# Patient Record
Sex: FEMALE | Race: WHITE | NOT HISPANIC OR LATINO | Employment: OTHER | URBAN - METROPOLITAN AREA
[De-identification: names, ages, dates, MRNs, and addresses within clinical notes are randomized per-mention and may not be internally consistent; named-entity substitution may affect disease eponyms.]

---

## 2023-03-31 LAB
ALANINE AMINOTRANSFERASE (SGPT) (U/L) IN SER/PLAS: 28 U/L (ref 7–45)
ALBUMIN (G/DL) IN SER/PLAS: 4.3 G/DL (ref 3.4–5)
ALKALINE PHOSPHATASE (U/L) IN SER/PLAS: 93 U/L (ref 33–136)
ANION GAP IN SER/PLAS: 14 MMOL/L (ref 10–20)
ASPARTATE AMINOTRANSFERASE (SGOT) (U/L) IN SER/PLAS: 22 U/L (ref 9–39)
BASOPHILS (10*3/UL) IN BLOOD BY AUTOMATED COUNT: 0.06 X10E9/L (ref 0–0.1)
BASOPHILS/100 LEUKOCYTES IN BLOOD BY AUTOMATED COUNT: 0.8 % (ref 0–2)
BILIRUBIN TOTAL (MG/DL) IN SER/PLAS: 0.8 MG/DL (ref 0–1.2)
CALCIDIOL (25 OH VITAMIN D3) (NG/ML) IN SER/PLAS: 56 NG/ML
CALCIUM (MG/DL) IN SER/PLAS: 9.2 MG/DL (ref 8.6–10.6)
CARBON DIOXIDE, TOTAL (MMOL/L) IN SER/PLAS: 26 MMOL/L (ref 21–32)
CHLORIDE (MMOL/L) IN SER/PLAS: 107 MMOL/L (ref 98–107)
CHOLESTEROL (MG/DL) IN SER/PLAS: 198 MG/DL (ref 0–199)
CHOLESTEROL IN HDL (MG/DL) IN SER/PLAS: 68.2 MG/DL
CHOLESTEROL/HDL RATIO: 2.9
CREATININE (MG/DL) IN SER/PLAS: 0.67 MG/DL (ref 0.5–1.05)
EOSINOPHILS (10*3/UL) IN BLOOD BY AUTOMATED COUNT: 0.16 X10E9/L (ref 0–0.4)
EOSINOPHILS/100 LEUKOCYTES IN BLOOD BY AUTOMATED COUNT: 2.2 % (ref 0–6)
ERYTHROCYTE DISTRIBUTION WIDTH (RATIO) BY AUTOMATED COUNT: 13.4 % (ref 11.5–14.5)
ERYTHROCYTE MEAN CORPUSCULAR HEMOGLOBIN CONCENTRATION (G/DL) BY AUTOMATED: 32.2 G/DL (ref 32–36)
ERYTHROCYTE MEAN CORPUSCULAR VOLUME (FL) BY AUTOMATED COUNT: 94 FL (ref 80–100)
ERYTHROCYTES (10*6/UL) IN BLOOD BY AUTOMATED COUNT: 5.13 X10E12/L (ref 4–5.2)
GFR FEMALE: >90 ML/MIN/1.73M2
GLUCOSE (MG/DL) IN SER/PLAS: 86 MG/DL (ref 74–99)
HEMATOCRIT (%) IN BLOOD BY AUTOMATED COUNT: 48.4 % (ref 36–46)
HEMOGLOBIN (G/DL) IN BLOOD: 15.6 G/DL (ref 12–16)
IMMATURE GRANULOCYTES/100 LEUKOCYTES IN BLOOD BY AUTOMATED COUNT: 0.3 % (ref 0–0.9)
LDL: 108 MG/DL (ref 0–99)
LEUKOCYTES (10*3/UL) IN BLOOD BY AUTOMATED COUNT: 7.4 X10E9/L (ref 4.4–11.3)
LYMPHOCYTES (10*3/UL) IN BLOOD BY AUTOMATED COUNT: 1.41 X10E9/L (ref 0.8–3)
LYMPHOCYTES/100 LEUKOCYTES IN BLOOD BY AUTOMATED COUNT: 19 % (ref 13–44)
MONOCYTES (10*3/UL) IN BLOOD BY AUTOMATED COUNT: 0.6 X10E9/L (ref 0.05–0.8)
MONOCYTES/100 LEUKOCYTES IN BLOOD BY AUTOMATED COUNT: 8.1 % (ref 2–10)
NEUTROPHILS (10*3/UL) IN BLOOD BY AUTOMATED COUNT: 5.16 X10E9/L (ref 1.6–5.5)
NEUTROPHILS/100 LEUKOCYTES IN BLOOD BY AUTOMATED COUNT: 69.6 % (ref 40–80)
NRBC (PER 100 WBCS) BY AUTOMATED COUNT: 0 /100 WBC (ref 0–0)
PLATELETS (10*3/UL) IN BLOOD AUTOMATED COUNT: 316 X10E9/L (ref 150–450)
POTASSIUM (MMOL/L) IN SER/PLAS: 4.1 MMOL/L (ref 3.5–5.3)
PROTEIN TOTAL: 6.6 G/DL (ref 6.4–8.2)
SODIUM (MMOL/L) IN SER/PLAS: 143 MMOL/L (ref 136–145)
THYROTROPIN (MIU/L) IN SER/PLAS BY DETECTION LIMIT <= 0.05 MIU/L: 2.3 MIU/L (ref 0.44–3.98)
TRIGLYCERIDE (MG/DL) IN SER/PLAS: 111 MG/DL (ref 0–149)
UREA NITROGEN (MG/DL) IN SER/PLAS: 20 MG/DL (ref 6–23)
VLDL: 22 MG/DL (ref 0–40)

## 2023-10-15 PROBLEM — M67.432 GANGLION CYST OF DORSUM OF LEFT WRIST: Status: ACTIVE | Noted: 2023-10-15

## 2023-10-15 PROBLEM — M15.9 GENERALIZED OSTEOARTHRITIS: Status: ACTIVE | Noted: 2023-10-15

## 2023-10-15 PROBLEM — I10 BENIGN ESSENTIAL HYPERTENSION: Status: ACTIVE | Noted: 2023-10-15

## 2023-10-15 PROBLEM — H90.3 SENSORINEURAL HEARING LOSS, BILATERAL: Status: ACTIVE | Noted: 2023-10-15

## 2023-10-15 PROBLEM — N32.81 OVERACTIVE BLADDER: Status: ACTIVE | Noted: 2023-10-15

## 2023-10-15 PROBLEM — E78.2 MIXED HYPERLIPIDEMIA: Status: ACTIVE | Noted: 2023-10-15

## 2023-10-15 PROBLEM — H91.90 HEARING LOSS: Status: ACTIVE | Noted: 2023-10-15

## 2023-10-15 PROBLEM — L90.5 SCAR CONDITION AND FIBROSIS OF SKIN: Status: ACTIVE | Noted: 2021-06-17

## 2023-10-15 PROBLEM — L81.4 OTHER MELANIN HYPERPIGMENTATION: Status: ACTIVE | Noted: 2021-06-17

## 2023-10-15 PROBLEM — M85.80 OSTEOPENIA: Status: ACTIVE | Noted: 2023-10-15

## 2023-10-15 PROBLEM — Z97.4 WEARS HEARING AID: Status: ACTIVE | Noted: 2023-10-15

## 2023-10-15 PROBLEM — D18.01 HEMANGIOMA OF SKIN AND SUBCUTANEOUS TISSUE: Status: ACTIVE | Noted: 2021-06-17

## 2023-10-15 PROBLEM — C44.319 BASAL CELL CARCINOMA OF SKIN OF OTHER PARTS OF FACE: Status: ACTIVE | Noted: 2021-06-17

## 2023-10-15 PROBLEM — M47.816 ARTHRITIS, LUMBAR SPINE: Status: ACTIVE | Noted: 2023-10-15

## 2023-10-15 PROBLEM — L57.0 ACTINIC KERATOSIS: Status: ACTIVE | Noted: 2021-06-17

## 2023-10-15 PROBLEM — D48.5 NEOPLASM OF UNCERTAIN BEHAVIOR OF SKIN: Status: ACTIVE | Noted: 2021-06-17

## 2023-10-15 PROBLEM — I87.2 VENOUS STASIS DERMATITIS OF LEFT LOWER EXTREMITY: Status: ACTIVE | Noted: 2023-10-15

## 2023-10-15 PROBLEM — L82.1 OTHER SEBORRHEIC KERATOSIS: Status: ACTIVE | Noted: 2021-06-17

## 2023-10-15 PROBLEM — R19.5 POSITIVE FIT (FECAL IMMUNOCHEMICAL TEST): Status: ACTIVE | Noted: 2023-10-15

## 2023-10-15 PROBLEM — Z85.828 PERSONAL HISTORY OF OTHER MALIGNANT NEOPLASM OF SKIN: Status: ACTIVE | Noted: 2021-06-17

## 2023-10-15 PROBLEM — D22.5 MELANOCYTIC NEVI OF TRUNK: Status: ACTIVE | Noted: 2021-06-17

## 2023-10-15 PROBLEM — M75.30 CALCIFIC TENDINITIS OF SHOULDER: Status: ACTIVE | Noted: 2023-10-15

## 2023-10-15 PROBLEM — H61.21 EXCESSIVE CERUMEN IN EAR CANAL, RIGHT: Status: ACTIVE | Noted: 2023-10-15

## 2023-10-15 RX ORDER — OXYBUTYNIN CHLORIDE 10 MG/1
1 TABLET, EXTENDED RELEASE ORAL DAILY
COMMUNITY
Start: 2018-10-11 | End: 2023-11-15 | Stop reason: SDUPTHER

## 2023-10-15 RX ORDER — DICLOFENAC SODIUM 75 MG/1
TABLET, DELAYED RELEASE ORAL
COMMUNITY
Start: 2016-09-29 | End: 2023-11-20 | Stop reason: SDUPTHER

## 2023-10-15 RX ORDER — ATORVASTATIN CALCIUM 40 MG/1
1 TABLET, FILM COATED ORAL NIGHTLY
COMMUNITY
Start: 2020-06-18 | End: 2023-11-15 | Stop reason: SDUPTHER

## 2023-10-15 RX ORDER — AMLODIPINE BESYLATE 10 MG/1
1 TABLET ORAL DAILY
COMMUNITY
Start: 2015-04-03 | End: 2023-10-17 | Stop reason: SDUPTHER

## 2023-10-15 RX ORDER — LISINOPRIL 40 MG/1
1 TABLET ORAL DAILY
COMMUNITY
End: 2023-11-15 | Stop reason: SDUPTHER

## 2023-10-15 RX ORDER — METHOCARBAMOL 750 MG/1
TABLET, FILM COATED ORAL
COMMUNITY
Start: 2022-04-18 | End: 2023-11-20 | Stop reason: SDUPTHER

## 2023-10-17 ENCOUNTER — OFFICE VISIT (OUTPATIENT)
Dept: PRIMARY CARE | Facility: CLINIC | Age: 73
End: 2023-10-17
Payer: COMMERCIAL

## 2023-10-17 VITALS
HEIGHT: 61 IN | TEMPERATURE: 96.8 F | HEART RATE: 75 BPM | OXYGEN SATURATION: 97 % | BODY MASS INDEX: 32.02 KG/M2 | WEIGHT: 169.6 LBS | DIASTOLIC BLOOD PRESSURE: 81 MMHG | SYSTOLIC BLOOD PRESSURE: 126 MMHG

## 2023-10-17 DIAGNOSIS — I10 BENIGN ESSENTIAL HYPERTENSION: Primary | ICD-10-CM

## 2023-10-17 DIAGNOSIS — N32.81 OVERACTIVE BLADDER: ICD-10-CM

## 2023-10-17 PROBLEM — C44.319 BASAL CELL CARCINOMA OF SKIN OF OTHER PARTS OF FACE: Status: RESOLVED | Noted: 2021-06-17 | Resolved: 2023-10-17

## 2023-10-17 PROCEDURE — 3074F SYST BP LT 130 MM HG: CPT | Performed by: INTERNAL MEDICINE

## 2023-10-17 PROCEDURE — 3079F DIAST BP 80-89 MM HG: CPT | Performed by: INTERNAL MEDICINE

## 2023-10-17 PROCEDURE — 99213 OFFICE O/P EST LOW 20 MIN: CPT | Performed by: INTERNAL MEDICINE

## 2023-10-17 PROCEDURE — 1160F RVW MEDS BY RX/DR IN RCRD: CPT | Performed by: INTERNAL MEDICINE

## 2023-10-17 PROCEDURE — 1036F TOBACCO NON-USER: CPT | Performed by: INTERNAL MEDICINE

## 2023-10-17 PROCEDURE — 1125F AMNT PAIN NOTED PAIN PRSNT: CPT | Performed by: INTERNAL MEDICINE

## 2023-10-17 PROCEDURE — 1159F MED LIST DOCD IN RCRD: CPT | Performed by: INTERNAL MEDICINE

## 2023-10-17 RX ORDER — AMLODIPINE BESYLATE 10 MG/1
10 TABLET ORAL DAILY
Qty: 30 TABLET | Refills: 0 | Status: SHIPPED | OUTPATIENT
Start: 2023-10-17 | End: 2023-11-15 | Stop reason: SDUPTHER

## 2023-10-17 ASSESSMENT — ENCOUNTER SYMPTOMS
FATIGUE: 0
SORE THROAT: 0
VOMITING: 0
DIZZINESS: 0
CONFUSION: 0
LIGHT-HEADEDNESS: 0
PALPITATIONS: 0
ABDOMINAL PAIN: 0
CONSTIPATION: 0
DIARRHEA: 0
COUGH: 0
FEVER: 0
HEADACHES: 0
ARTHRALGIAS: 1
CHILLS: 0
NAUSEA: 0
SHORTNESS OF BREATH: 0

## 2023-10-17 ASSESSMENT — PAIN SCALES - GENERAL: PAINLEVEL: 3

## 2023-10-25 ENCOUNTER — OFFICE VISIT (OUTPATIENT)
Dept: PRIMARY CARE | Facility: CLINIC | Age: 73
End: 2023-10-25
Payer: COMMERCIAL

## 2023-10-25 VITALS
BODY MASS INDEX: 31.96 KG/M2 | HEART RATE: 72 BPM | OXYGEN SATURATION: 98 % | HEIGHT: 61 IN | SYSTOLIC BLOOD PRESSURE: 135 MMHG | DIASTOLIC BLOOD PRESSURE: 79 MMHG | TEMPERATURE: 96.8 F | WEIGHT: 169.3 LBS

## 2023-10-25 DIAGNOSIS — S76.211A STRAIN OF GROIN, RIGHT, INITIAL ENCOUNTER: Primary | ICD-10-CM

## 2023-10-25 PROCEDURE — 1160F RVW MEDS BY RX/DR IN RCRD: CPT | Performed by: INTERNAL MEDICINE

## 2023-10-25 PROCEDURE — 3078F DIAST BP <80 MM HG: CPT | Performed by: INTERNAL MEDICINE

## 2023-10-25 PROCEDURE — 1125F AMNT PAIN NOTED PAIN PRSNT: CPT | Performed by: INTERNAL MEDICINE

## 2023-10-25 PROCEDURE — 99213 OFFICE O/P EST LOW 20 MIN: CPT | Performed by: INTERNAL MEDICINE

## 2023-10-25 PROCEDURE — 1159F MED LIST DOCD IN RCRD: CPT | Performed by: INTERNAL MEDICINE

## 2023-10-25 PROCEDURE — 3075F SYST BP GE 130 - 139MM HG: CPT | Performed by: INTERNAL MEDICINE

## 2023-10-25 PROCEDURE — 1036F TOBACCO NON-USER: CPT | Performed by: INTERNAL MEDICINE

## 2023-10-25 ASSESSMENT — ENCOUNTER SYMPTOMS
CHILLS: 0
SORE THROAT: 0
CONFUSION: 0
PALPITATIONS: 0
FEVER: 0
HEADACHES: 0
BACK PAIN: 1
HEMATURIA: 0
COUGH: 0
FATIGUE: 0
DIARRHEA: 0
ABDOMINAL PAIN: 0
NAUSEA: 0
CONSTIPATION: 0
SHORTNESS OF BREATH: 0
VOMITING: 0
ARTHRALGIAS: 1
MYALGIAS: 1
DYSURIA: 0
LIGHT-HEADEDNESS: 0
DIZZINESS: 0

## 2023-10-25 ASSESSMENT — PAIN SCALES - GENERAL: PAINLEVEL: 3

## 2023-10-25 NOTE — PROGRESS NOTES
"Subjective   Patient ID: Kimberly Segundo is a 73 y.o. female who presents for Follow-up (Groin pain, pulled muscles ).    HPI     Since last visit on 10/17 - was laying in bed when she felt a tightness in her right groin.  Felt firm, leg felt heavy.  Like a cramp, then she tried to stretch out her leg and hurt worse.  Has felt better over last 2 days.  Tried heating pad and Voltaren gel, which did seem to help.    Might be going to visit daughter in [place] over Thanksgiving.  Right shoulder replacement is scheduled for 12/4.    Review of Systems   Constitutional:  Negative for chills, fatigue and fever.   HENT:  Negative for sore throat.    Respiratory:  Negative for cough and shortness of breath.    Cardiovascular:  Negative for chest pain, palpitations and leg swelling.   Gastrointestinal:  Negative for abdominal pain, constipation, diarrhea, nausea and vomiting.   Genitourinary:  Negative for dysuria, hematuria and pelvic pain.   Musculoskeletal:  Positive for arthralgias, back pain, gait problem and myalgias.   Skin:  Negative for rash.   Neurological:  Negative for dizziness, light-headedness and headaches.   Psychiatric/Behavioral:  Negative for confusion.        Objective   /79   Pulse 72   Temp 36 °C (96.8 °F) (Temporal)   Ht 1.549 m (5' 1\")   Wt 76.8 kg (169 lb 4.8 oz)   SpO2 98%   BMI 31.99 kg/m²     Physical Exam  HENT:      Head: Normocephalic and atraumatic.   Pulmonary:      Effort: Pulmonary effort is normal.   Musculoskeletal:      Comments: Able to get onto exam table.  No masses, lumps, rashes, or induration in the right groin / medial thigh area.  No trigger of symptoms with rotation at hip.  Pain localizes to the right proximal medial thigh.   Neurological:      General: No focal deficit present.      Mental Status: She is alert and oriented to person, place, and time. Mental status is at baseline.      Gait: Gait abnormal.      Comments: Uses cane, braces on lower legs "   Psychiatric:         Mood and Affect: Mood normal.         Behavior: Behavior normal.         Thought Content: Thought content normal.         Judgment: Judgment normal.         Assessment/Plan   Problem List Items Addressed This Visit    None  Visit Diagnoses         Codes    Strain of groin, right, initial encounter    -  Primary S76.211A          Right groin strain - sounds like it started with a muscle cramp and then subsequent pulled muscle when she tried to straighten her leg.  No signs of soft tissue disease such as cellulitis or rash.  There are no masses/lumps and no GI or pelvic symptoms that would suggest a hernia.  Continued conservative management recommended.  Stay hydrated and try to stretch as able.  Follow-up if symptoms worsen or fail to improve.

## 2023-11-08 ENCOUNTER — TELEPHONE (OUTPATIENT)
Dept: PRIMARY CARE | Facility: CLINIC | Age: 73
End: 2023-11-08
Payer: COMMERCIAL

## 2023-11-08 NOTE — TELEPHONE ENCOUNTER
Discussed with patient over phone.  She wanted to know my opinion on outpatient shoulder replacement.  She has concerns about going home right after surgery and wants to know if she should insist on staying inpatient for 1-2 nights.  I encouraged her to trust their protocol and ask questions to the surgery coordinator.  If there is a need for her to remain in hospital, I am sure that they will not send her home right away.  Dr. Moore

## 2023-11-08 NOTE — TELEPHONE ENCOUNTER
Pt called to let you know ..  She checked and her Lot# for Oxybutin is not part of recall.  She was told anyone taking the medication got the letter.

## 2023-11-14 ENCOUNTER — TELEPHONE (OUTPATIENT)
Dept: PRIMARY CARE | Facility: CLINIC | Age: 73
End: 2023-11-14
Payer: COMMERCIAL

## 2023-11-14 NOTE — TELEPHONE ENCOUNTER
Patient called stating she is trying to be proactive.  Patient is having shoulder surgery 12-4-23.  Patient will be switching pharmacies to [facility] on 1-1-24.  Patient states if we receive a request from her current pharmacy requesting refill to only refill through December and she will need all new prescriptions for all her medications in January 2024 to be sent to [facility] [phone] fax [phone].

## 2023-11-15 DIAGNOSIS — N32.81 OVERACTIVE BLADDER: ICD-10-CM

## 2023-11-15 DIAGNOSIS — E78.2 MIXED HYPERLIPIDEMIA: Primary | ICD-10-CM

## 2023-11-15 DIAGNOSIS — I10 BENIGN ESSENTIAL HYPERTENSION: ICD-10-CM

## 2023-11-15 RX ORDER — OXYBUTYNIN CHLORIDE 10 MG/1
10 TABLET, EXTENDED RELEASE ORAL DAILY
Qty: 90 TABLET | Refills: 3 | Status: SHIPPED | OUTPATIENT
Start: 2024-01-01 | End: 2023-12-05

## 2023-11-15 RX ORDER — AMLODIPINE BESYLATE 10 MG/1
10 TABLET ORAL DAILY
Qty: 90 TABLET | Refills: 3 | Status: SHIPPED | OUTPATIENT
Start: 2024-01-01 | End: 2023-12-14 | Stop reason: SDUPTHER

## 2023-11-15 RX ORDER — ATORVASTATIN CALCIUM 40 MG/1
40 TABLET, FILM COATED ORAL NIGHTLY
Qty: 90 TABLET | Refills: 3 | Status: SHIPPED | OUTPATIENT
Start: 2024-01-01 | End: 2024-04-01 | Stop reason: SDUPTHER

## 2023-11-15 RX ORDER — LISINOPRIL 40 MG/1
40 TABLET ORAL DAILY
Qty: 90 TABLET | Refills: 3 | Status: SHIPPED | OUTPATIENT
Start: 2024-01-01 | End: 2023-12-05

## 2023-11-20 ENCOUNTER — TELEMEDICINE CLINICAL SUPPORT (OUTPATIENT)
Dept: PREADMISSION TESTING | Facility: HOSPITAL | Age: 73
End: 2023-11-20
Payer: COMMERCIAL

## 2023-11-20 DIAGNOSIS — M15.9 GENERALIZED OSTEOARTHRITIS: Primary | ICD-10-CM

## 2023-11-20 RX ORDER — DICLOFENAC SODIUM 75 MG/1
75 TABLET, DELAYED RELEASE ORAL 2 TIMES DAILY PRN
Qty: 180 TABLET | Refills: 3 | COMMUNITY
Start: 2023-11-20 | End: 2024-02-05 | Stop reason: SDUPTHER

## 2023-11-20 RX ORDER — CHOLECALCIFEROL (VITAMIN D3) 50 MCG
50 TABLET ORAL DAILY
COMMUNITY

## 2023-11-20 RX ORDER — DEXTROMETHORPHAN HYDROBROMIDE, GUAIFENESIN 5; 100 MG/5ML; MG/5ML
650 LIQUID ORAL EVERY 8 HOURS PRN
COMMUNITY
End: 2023-12-04 | Stop reason: HOSPADM

## 2023-11-20 RX ORDER — METHOCARBAMOL 750 MG/1
TABLET, FILM COATED ORAL
Qty: 360 TABLET | Refills: 3 | COMMUNITY
Start: 2023-11-20 | End: 2024-01-03 | Stop reason: SDUPTHER

## 2023-11-21 ENCOUNTER — PRE-ADMISSION TESTING (OUTPATIENT)
Dept: PREADMISSION TESTING | Facility: HOSPITAL | Age: 73
End: 2023-11-21
Payer: COMMERCIAL

## 2023-11-21 ENCOUNTER — HOSPITAL ENCOUNTER (OUTPATIENT)
Dept: CARDIOLOGY | Facility: HOSPITAL | Age: 73
Discharge: HOME | End: 2023-11-21
Payer: COMMERCIAL

## 2023-11-21 ENCOUNTER — LAB (OUTPATIENT)
Dept: LAB | Facility: LAB | Age: 73
End: 2023-11-21
Payer: COMMERCIAL

## 2023-11-21 VITALS
HEIGHT: 59 IN | DIASTOLIC BLOOD PRESSURE: 81 MMHG | TEMPERATURE: 97.9 F | HEART RATE: 82 BPM | BODY MASS INDEX: 31.56 KG/M2 | WEIGHT: 156.53 LBS | SYSTOLIC BLOOD PRESSURE: 150 MMHG | OXYGEN SATURATION: 96 % | RESPIRATION RATE: 18 BRPM

## 2023-11-21 DIAGNOSIS — I10 HYPERTENSION, UNSPECIFIED TYPE: ICD-10-CM

## 2023-11-21 DIAGNOSIS — Z01.818 PREPROCEDURAL EXAMINATION: ICD-10-CM

## 2023-11-21 DIAGNOSIS — I10 HYPERTENSION, UNSPECIFIED TYPE: Primary | ICD-10-CM

## 2023-11-21 LAB
ABO GROUP (TYPE) IN BLOOD: NORMAL
ALBUMIN SERPL BCP-MCNC: 4.2 G/DL (ref 3.4–5)
ALP SERPL-CCNC: 110 U/L (ref 33–136)
ALT SERPL W P-5'-P-CCNC: 20 U/L (ref 7–45)
ANION GAP SERPL CALC-SCNC: 12 MMOL/L (ref 10–20)
ANTIBODY SCREEN: NORMAL
AST SERPL W P-5'-P-CCNC: 20 U/L (ref 9–39)
BASOPHILS # BLD AUTO: 0.05 X10*3/UL (ref 0–0.1)
BASOPHILS NFR BLD AUTO: 0.6 %
BILIRUB SERPL-MCNC: 0.6 MG/DL (ref 0–1.2)
BUN SERPL-MCNC: 17 MG/DL (ref 6–23)
CALCIUM SERPL-MCNC: 9 MG/DL (ref 8.6–10.3)
CHLORIDE SERPL-SCNC: 102 MMOL/L (ref 98–107)
CO2 SERPL-SCNC: 29 MMOL/L (ref 21–32)
CREAT SERPL-MCNC: 0.68 MG/DL (ref 0.5–1.05)
EOSINOPHIL # BLD AUTO: 0.09 X10*3/UL (ref 0–0.4)
EOSINOPHIL NFR BLD AUTO: 1.1 %
ERYTHROCYTE [DISTWIDTH] IN BLOOD BY AUTOMATED COUNT: 12.6 % (ref 11.5–14.5)
GFR SERPL CREATININE-BSD FRML MDRD: >90 ML/MIN/1.73M*2
GLUCOSE SERPL-MCNC: 86 MG/DL (ref 74–99)
HCT VFR BLD AUTO: 49.9 % (ref 36–46)
HGB BLD-MCNC: 15.8 G/DL (ref 12–16)
IMM GRANULOCYTES # BLD AUTO: 0.01 X10*3/UL (ref 0–0.5)
IMM GRANULOCYTES NFR BLD AUTO: 0.1 % (ref 0–0.9)
LYMPHOCYTES # BLD AUTO: 1.19 X10*3/UL (ref 0.8–3)
LYMPHOCYTES NFR BLD AUTO: 15.1 %
MCH RBC QN AUTO: 30 PG (ref 26–34)
MCHC RBC AUTO-ENTMCNC: 31.7 G/DL (ref 32–36)
MCV RBC AUTO: 95 FL (ref 80–100)
MONOCYTES # BLD AUTO: 0.52 X10*3/UL (ref 0.05–0.8)
MONOCYTES NFR BLD AUTO: 6.6 %
NEUTROPHILS # BLD AUTO: 6.01 X10*3/UL (ref 1.6–5.5)
NEUTROPHILS NFR BLD AUTO: 76.5 %
NRBC BLD-RTO: 0 /100 WBCS (ref 0–0)
PLATELET # BLD AUTO: 303 X10*3/UL (ref 150–450)
POTASSIUM SERPL-SCNC: 4 MMOL/L (ref 3.5–5.3)
PROT SERPL-MCNC: 6.8 G/DL (ref 6.4–8.2)
RBC # BLD AUTO: 5.27 X10*6/UL (ref 4–5.2)
RH FACTOR (ANTIGEN D): NORMAL
SODIUM SERPL-SCNC: 139 MMOL/L (ref 136–145)
WBC # BLD AUTO: 7.9 X10*3/UL (ref 4.4–11.3)

## 2023-11-21 PROCEDURE — 86900 BLOOD TYPING SEROLOGIC ABO: CPT

## 2023-11-21 PROCEDURE — 85025 COMPLETE CBC W/AUTO DIFF WBC: CPT

## 2023-11-21 PROCEDURE — 86850 RBC ANTIBODY SCREEN: CPT

## 2023-11-21 PROCEDURE — 80053 COMPREHEN METABOLIC PANEL: CPT

## 2023-11-21 PROCEDURE — 36415 COLL VENOUS BLD VENIPUNCTURE: CPT

## 2023-11-21 PROCEDURE — 87081 CULTURE SCREEN ONLY: CPT | Mod: AHULAB | Performed by: NURSE PRACTITIONER

## 2023-11-21 PROCEDURE — 86901 BLOOD TYPING SEROLOGIC RH(D): CPT

## 2023-11-21 PROCEDURE — 93005 ELECTROCARDIOGRAM TRACING: CPT

## 2023-11-21 PROCEDURE — 99204 OFFICE O/P NEW MOD 45 MIN: CPT | Performed by: NURSE PRACTITIONER

## 2023-11-21 RX ORDER — CHLORHEXIDINE GLUCONATE ORAL RINSE 1.2 MG/ML
15 SOLUTION DENTAL DAILY
Qty: 30 ML | Refills: 0 | Status: SHIPPED | OUTPATIENT
Start: 2023-11-21 | End: 2023-11-23

## 2023-11-21 ASSESSMENT — ENCOUNTER SYMPTOMS
MUSCULOSKELETAL NEGATIVE: 1
NEUROLOGICAL NEGATIVE: 1
CONSTITUTIONAL NEGATIVE: 1
CARDIOVASCULAR NEGATIVE: 1
RESPIRATORY NEGATIVE: 1
GASTROINTESTINAL NEGATIVE: 1

## 2023-11-21 NOTE — CPM/PAT H&P
CPM/PAT Evaluation       Name: Kimberly Segundo (Kimberly Segundo)  /Age: 1950/73 y.o.     In-Person       SURGEON :DR PAM GILMORE     Surgery, Date, and Length:  Right Shoulder Reverse Replacement , 23  HPI:  This a 73y.o. fe-male who presents for presurgical evaluation for  for above mentioned procedure . Imaging shows severe advanced arthritis .After discussion of the risks and benefits with Dr. GILMORE the patient elects to proceed with the planned procedure.       Past Medical History:   Diagnosis Date    Basal cell carcinoma of skin of other parts of face 2021    Carpal tunnel syndrome, bilateral upper limbs 10/10/2018    Bilateral carpal tunnel syndrome    COVID-19 2021    COVID-19 virus infection    DVT (deep venous thrombosis) (CMS/[facility])     s/p knee surgery, was briefly on Lovenox, no further issues,    Encounter for general adult medical examination without abnormal findings 2022    Medicare annual wellness visit, subsequent    Encounter for immunization     Encounter for immunization    Encounter for screening for malignant neoplasm of colon 2022    Colon cancer screening    HLD (hyperlipidemia)     managed on meds    [place] (hard of hearing)     bilateral hearing aids    HTN (hypertension)     managed on meds    OA (osteoarthritis)     OAB (overactive bladder)     managed on meds    Osteopenia     Personal history of other malignant neoplasm of skin     Personal history of malignant neoplasm of skin    Personal history of other medical treatment 2022    History of screening mammography       Past Surgical History:   Procedure Laterality Date    ANKLE SURGERY  10/10/2018    Ankle Surgeryx4 per patient she has 3 metal plates and 4 screws    BLADDER SURGERY  10/10/2018    Bladder Surgery, interstem placement    HYSTERECTOMY  10/10/2018    Hysterectomy    HYSTEROSCOPY  10/10/2018    Hysteroscopy With Endometrial Ablation    HYSTEROSCOPY      with endometrial  ablation    OTHER SURGICAL HISTORY  04/09/2019    Carpal tunnel surgery    OTHER SURGICAL HISTORY  12/28/2018    Carpal tunnel surgery    OTHER SURGICAL HISTORY  07/27/2022    Tonsillectomy with adenoidectomy    TOTAL KNEE ARTHROPLASTY  10/10/2018    Total Knee Replacement Left    TOTAL KNEE ARTHROPLASTY  10/10/2018    Total Knee Replacement Rightx2     Anesthesia History    PONV     Pt denies any past history of anesthetic complications such as , awareness, prolonged sedation, dental damage, aspiration, cardiac arrest, difficult intubation, difficult I.V. access or unexpected hospital admissions.  NO malignant hyperthermia and or pseudo cholinesterase deficiency.    The patient is not  a [employer] and will accept blood and blood products if medically indicated.   No history of blood transfusions .Type and screen sent.         Family History   Problem Relation Name Age of Onset    Arthritis Mother      Hypertension Mother      Heart attack Mother      Osteoarthritis Mother      Heart failure Father      Coronary artery disease Father      Hypertension Father      Stroke Father      Osteoarthritis Sister      Other (hld) Sister      Heart attack Brother      Breast cancer Daughter         Allergies   Allergen Reactions    Ciprofloxacin Unknown     It was so long ago I don't remember    Tramadol Nausea Only and Nausea/vomiting       Prior to Admission medications    Medication Sig Start Date End Date Taking? Authorizing Provider   acetaminophen (Tylenol 8 HOUR) 650 mg ER tablet Take 1 tablet (650 mg) by mouth every 8 hours if needed for mild pain (1 - 3). Do not crush, chew, or split.    Historical Provider, MD   amLODIPine (Norvasc) 10 mg tablet Take 1 tablet (10 mg) by mouth once daily. Do not start before January 1, 2024. 1/1/24 12/31/24  Viviana Moore MD   atorvastatin (Lipitor) 40 mg tablet Take 1 tablet (40 mg) by mouth once daily at bedtime. Do not start before January 1, 2024. 1/1/24 12/31/24   Viviana Moore MD   calcium carbonate/vitamin D3 (CALCIUM 600 + D,3, ORAL) Take 1 capsule by mouth 2 times a day.    Historical Provider, MD   cholecalciferol (Vitamin D3) 50 MCG (2000 UT) tablet Take 1 tablet (50 mcg) by mouth once daily.    Historical Provider, MD   diclofenac (Voltaren) 75 mg EC tablet Take 1 tablet (75 mg) by mouth 2 times a day as needed (pain). Do not crush, chew, or split. 11/20/23 11/19/24  Helen Arshad MD   diclofenac sodium 1 % kit APPLY 4 G OF GEL TO LOWER AFFECTED AREAS 4 TIMES A DAY NO MORE THAN 16 G TO ANY ONE AFFECTED JOINT 11/20/23   Helen Arshad MD   lisinopril 40 mg tablet Take 1 tablet (40 mg) by mouth once daily. Do not start before January 1, 2024. 1/1/24 12/31/24  Viviana Moore MD   methocarbamol (Robaxin) 750 mg tablet TAKE 1 TABLET 4 TIMES DAILY AS NEEDED. 11/20/23   Helen Arshad MD   oxybutynin XL (Ditropan-XL) 10 mg 24 hr tablet Take 1 tablet (10 mg) by mouth once daily. Do not start before January 1, 2024. 1/1/24 12/31/24  Viviana Moore MD   amLODIPine (Norvasc) 10 mg tablet Take 1 tablet (10 mg) by mouth once daily. 10/17/23 11/15/23  Viviana Moore MD   atorvastatin (Lipitor) 40 mg tablet Take 1 tablet (40 mg) by mouth once daily at bedtime. 6/18/20 11/15/23  Historical Provider, MD   diclofenac (Voltaren) 75 mg EC tablet one po bid 9/29/16 11/20/23  Historical Provider, MD   diclofenac sodium 1 % kit APPLY 4 G OF GEL TO LOWER AFFECTED AREAS 4 TIMES A DAY NO MORE THAN 16 G TO ANY ONE AFFECTED JOINT 1/3/22 11/20/23  Historical Provider, MD   lisinopril 40 mg tablet Take 1 tablet (40 mg) by mouth once daily.  11/15/23  Historical Provider, MD   methocarbamol (Robaxin) 750 mg tablet TAKE 1 TABLET 4 TIMES DAILY AS NEEDED. 4/18/22 11/20/23  Historical Provider, MD   oxybutynin XL (Ditropan-XL) 10 mg 24 hr tablet Take 1 tablet (10 mg) by mouth once daily. 10/11/18 11/15/23  Historical Provider, MD ERA ROS:   Constitutional:   neg   "  Neuro/Psych:   neg    Eyes:   Ears:   Nose:   neg    Mouth:   Throat:   Neck:   Cardio:   neg    Respiratory:   neg    Endocrine:   GI:   neg    :   neg    Musculoskeletal:   neg    Hematologic:   neg    Skin:  neg        Physical Exam  Vitals reviewed.   Constitutional:       Appearance: Normal appearance.   HENT:      Head: Normocephalic and atraumatic.      Mouth/Throat:      Mouth: Mucous membranes are dry.   Eyes:      Extraocular Movements: Extraocular movements intact.      Pupils: Pupils are equal, round, and reactive to light.   Cardiovascular:      Rate and Rhythm: Normal rate and regular rhythm.      Pulses: Normal pulses.      Heart sounds: Normal heart sounds.   Pulmonary:      Effort: Pulmonary effort is normal.      Breath sounds: Normal breath sounds.   Musculoskeletal:      Comments: BILATERAL LOWER EXTREMITY BRACES   DECREASE RT SHOULDER    Skin:     General: Skin is warm and dry.   Neurological:      Mental Status: She is alert and oriented to person, place, and time.   Psychiatric:         Mood and Affect: Mood normal.         Behavior: Behavior normal.          PAT AIRWAY:   Airway:     Mallampati::  II   ONE CROWN       /81   Pulse 82   Temp 36.6 °C (97.9 °F)   Resp 18   Ht 1.499 m (4' 11\")   Wt 71 kg (156 lb 8.4 oz)   SpO2 96%   BMI 31.61 kg/m²     EKG   NSR   INFERIOR INFARCT AGE UNDETERMINED   POSS ANTERIOR INFARCT AGE UNDETERMINED     Lab Results   Component Value Date    WBC 7.9 11/21/2023    HGB 15.8 11/21/2023    HCT 49.9 (H) 11/21/2023    MCV 95 11/21/2023     11/21/2023     Lab Results   Component Value Date    GLUCOSE 86 03/31/2023    CALCIUM 9.2 03/31/2023     03/31/2023    K 4.1 03/31/2023    CO2 26 03/31/2023     03/31/2023    BUN 20 03/31/2023    CREATININE 0.67 03/31/2023         ASSESSMENT/PLAN    Patient is a 73 year-old  scheduled for Right Shoulder Reverse Replacement  with Dr. Vázquez   on  12/4/23 .    CARDIOVASCULAR:  RCRI score / Risk: " The patients score is 0 based on history . Per ACC/AHA guidelines this places her  at  3.9% risk for MACE undergoing a intermediate  risk procedure . The patient has the following risk factors: DENIES   Functional Capacity: The patients exercise tolerance is  4  METS. This is based on the patients abililty to ascend a flight of stairs  and walk 2 block w/o chest pain or other anginal equivalents.  . Patient denies  active cardiac symptoms or anginal equivalents .      PULMONARY:  The patient has the following factors that place them at increased risk of perioperative pulmonary complications;age greater than 65/BMI greater than 27/greater than 2.5 hour procedure.  Postoperatively the patient would benefit from early pulmonary toilet/incentive spirometry q 1-2 hours while awake/pulse oximetry/cautious use of respiratory depressant medications such as opioids/elevate the HOB/oral hygiene.      DVT:  CAPRINI SCORE=11  The patient has the following factors that increase her  Risk for thrombus formation ; Virchow's triad ,age>70, bmi>25, TSA, , HX DVT after B/L TKA , Surgical procedure >2 hrs  procedure .    Recommendations: DVT prophylaxis  per Dr. Vázquez  protocol . SCD's, BIJU's, and early ambulation are recommended. Heparin or LMWH is recommended for the very high risk .      Risk assessment complete.  Patient is scheduled for  intermediate  surgical risk procedure.  Patient is considered an acceptable  risk to proceed with the planned procedure.      Preoperative medication instructions were provided and reviewed with the patient.  Any additional testing or evaluation was explained to the patient.  Nothing by mouth instructions were discussed and patient's questions were answered prior to conclusion to this encounter.  Patient verbalized understanding of preoperative instructions given in preadmission testing; discharge instructions available in EMR.

## 2023-11-21 NOTE — PREPROCEDURE INSTRUCTIONS
Medication List            Accurate as of November 21, 2023  9:44 AM. Always use your most recent med list.                acetaminophen 650 mg ER tablet  Commonly known as: Tylenol 8 HOUR  Medication Adjustments for Surgery: Take morning of surgery with sip of water, no other fluids     amLODIPine 10 mg tablet  Commonly known as: Norvasc  Take 1 tablet (10 mg) by mouth once daily. Do not start before January 1, 2024.  Start taking on: January 1, 2024  Medication Adjustments for Surgery: Take morning of surgery with sip of water, no other fluids     atorvastatin 40 mg tablet  Commonly known as: Lipitor  Take 1 tablet (40 mg) by mouth once daily at bedtime. Do not start before January 1, 2024.  Start taking on: January 1, 2024  Medication Adjustments for Surgery: Take morning of surgery with sip of water, no other fluids     CALCIUM 600 + D(3) ORAL  Medication Adjustments for Surgery: Continue until night before surgery     chlorhexidine 0.12 % solution  Commonly known as: Peridex  Use 15 mL in the mouth or throat once daily for 2 days.  Medication Adjustments for Surgery: Other (Comment)  Notes to patient: Use as directed , discard remainder      * diclofenac sodium 1 % kit  APPLY 4 G OF GEL TO LOWER AFFECTED AREAS 4 TIMES A DAY NO MORE THAN 16 G TO ANY ONE AFFECTED JOINT  Medication Adjustments for Surgery: Other (Comment)  Notes to patient: Stop using when surgical showers begin      * diclofenac 75 mg EC tablet  Commonly known as: Voltaren  Take 1 tablet (75 mg) by mouth 2 times a day as needed (pain). Do not crush, chew, or split.  Medication Adjustments for Surgery: Stop 7 days before surgery     lisinopril 40 mg tablet  Take 1 tablet (40 mg) by mouth once daily. Do not start before January 1, 2024.  Start taking on: January 1, 2024  Medication Adjustments for Surgery: Continue until night before surgery     methocarbamol 750 mg tablet  Commonly known as: Robaxin  TAKE 1 TABLET 4 TIMES DAILY AS  NEEDED.  Medication Adjustments for Surgery: Continue until night before surgery     oxybutynin XL 10 mg 24 hr tablet  Commonly known as: Ditropan-XL  Take 1 tablet (10 mg) by mouth once daily. Do not start before January 1, 2024.  Start taking on: January 1, 2024  Medication Adjustments for Surgery: Continue until night before surgery     Vitamin D3 50 MCG (2000 UT) tablet  Generic drug: cholecalciferol  Medication Adjustments for Surgery: Continue until night before surgery           * This list has 2 medication(s) that are the same as other medications prescribed for you. Read the directions carefully, and ask your doctor or other care provider to review them with you.                       CONTACT SURGEON'S OFFICE IF YOU DEVELOP:  * Fever = 100.4 F   * New respiratory symptoms (e.g. cough, shortness of breath, respiratory distress, sore throat)  * Recent loss of taste or smell  *Flu like symptoms such as headache, fatigue or gastrointestinal symptoms  * You develop any open sores, shingles, burning or painful urination   AND/OR:  * You no longer wish to have the surgery.  * Any other personal circumstances change that may lead to the need to cancel or defer this surgery.  *You were admitted to any hospital within one week of your planned procedure.    SMOKING:  *Quitting smoking can make a huge difference to your health and recovery from surgery.    *If you need help with quitting, call 1-891-QUIT-NOW.    THE DAY BEFORE SURGERY:  *Do not eat any food after midnight the night before surgery.   *You are permitted to drink clear liquids (i.e. water, black coffee, tea, clear broth, apple juice) up to 2 hours before your surgery.  DIABETICS:  Please check fasting blood sugar  upon waking up.  If fasting sugar is <80 mg/dl, please drink 100ml/3oz of apple juice no later than 2 hours prior to surgery.      SURGICAL TIME  *You will be contacted between 2 p.m. and 6 p.m. the business day before your surgery with your  arrival time.  *If you haven't received a call by 6pm, call [phone].  *Scheduled surgery times may change and you will be notified if this occurs-check your personal voicemail for any updates.    ON THE MORNING OF SURGERY:  *Wear comfortable, loose fitting clothing.   *Do not use moisturizers, creams, lotions or perfume.  *All jewelry and valuables should be left at home.  *Prosthetic devices such as contact lenses, hearing aids, dentures, eyelash extensions, hairpins and body piercing must be removed before surgery.    BRING WITH YOU:  *Photo ID and insurance card  *Current list of medicines and allergies  *Pacemaker/Defibrillator/Heart stent cards  *CPAP machine and mask  *Slings/splints/crutches  *Copy of your complete Advanced Directive/DHPOA-if applicable  *Neurostimulator implant remote    PARKING AND ARRIVAL:  *Check in at the Main Entrance desk and let them know you are here for surgery.  *You will be directed to the 2nd floor surgical waiting area.    AFTER OUTPATIENT SURGERY:  *A responsible adult MUST accompany you at the time of discharge and stay with you for 24 hours after your surgery.  *You may NOT drive yourself home after surgery.  *You may use a taxi or ride sharing service ([employer], [employer]) to return home ONLY if you are accompanied by a friend or family member.  *Instructions for resuming your medications will be provided by your surgeon.         YOU HAVE REVIEWED THE MEDICATIONS ON THIS SHEET AND YOU VERIFY THESE ARE ALL THE MEDICATIONS AND OVER THE COUNTER MEDICATIONS THAT YOU TAKE .

## 2023-11-21 NOTE — H&P (VIEW-ONLY)
CPM/PAT Evaluation       Name: Kimberly Segundo (Kimberly Segundo)  /Age: 1950/73 y.o.     In-Person       SURGEON :DR PAM GILMORE     Surgery, Date, and Length:  Right Shoulder Reverse Replacement , 23  HPI:  This a 73y.o. fe-male who presents for presurgical evaluation for  for above mentioned procedure . Imaging shows severe advanced arthritis .After discussion of the risks and benefits with Dr. GILMORE the patient elects to proceed with the planned procedure.       Past Medical History:   Diagnosis Date    Basal cell carcinoma of skin of other parts of face 2021    Carpal tunnel syndrome, bilateral upper limbs 10/10/2018    Bilateral carpal tunnel syndrome    COVID-19 2021    COVID-19 virus infection    DVT (deep venous thrombosis) (CMS/[facility])     s/p knee surgery, was briefly on Lovenox, no further issues,    Encounter for general adult medical examination without abnormal findings 2022    Medicare annual wellness visit, subsequent    Encounter for immunization     Encounter for immunization    Encounter for screening for malignant neoplasm of colon 2022    Colon cancer screening    HLD (hyperlipidemia)     managed on meds    [place] (hard of hearing)     bilateral hearing aids    HTN (hypertension)     managed on meds    OA (osteoarthritis)     OAB (overactive bladder)     managed on meds    Osteopenia     Personal history of other malignant neoplasm of skin     Personal history of malignant neoplasm of skin    Personal history of other medical treatment 2022    History of screening mammography       Past Surgical History:   Procedure Laterality Date    ANKLE SURGERY  10/10/2018    Ankle Surgeryx4 per patient she has 3 metal plates and 4 screws    BLADDER SURGERY  10/10/2018    Bladder Surgery, interstem placement    HYSTERECTOMY  10/10/2018    Hysterectomy    HYSTEROSCOPY  10/10/2018    Hysteroscopy With Endometrial Ablation    HYSTEROSCOPY      with endometrial  ablation    OTHER SURGICAL HISTORY  04/09/2019    Carpal tunnel surgery    OTHER SURGICAL HISTORY  12/28/2018    Carpal tunnel surgery    OTHER SURGICAL HISTORY  07/27/2022    Tonsillectomy with adenoidectomy    TOTAL KNEE ARTHROPLASTY  10/10/2018    Total Knee Replacement Left    TOTAL KNEE ARTHROPLASTY  10/10/2018    Total Knee Replacement Rightx2     Anesthesia History    PONV     Pt denies any past history of anesthetic complications such as , awareness, prolonged sedation, dental damage, aspiration, cardiac arrest, difficult intubation, difficult I.V. access or unexpected hospital admissions.  NO malignant hyperthermia and or pseudo cholinesterase deficiency.    The patient is not  a Catholic and will accept blood and blood products if medically indicated.   No history of blood transfusions .Type and screen sent.         Family History   Problem Relation Name Age of Onset    Arthritis Mother      Hypertension Mother      Heart attack Mother      Osteoarthritis Mother      Heart failure Father      Coronary artery disease Father      Hypertension Father      Stroke Father      Osteoarthritis Sister      Other (hld) Sister      Heart attack Brother      Breast cancer Daughter         Allergies   Allergen Reactions    Ciprofloxacin Unknown     It was so long ago I don't remember    Tramadol Nausea Only and Nausea/vomiting       Prior to Admission medications    Medication Sig Start Date End Date Taking? Authorizing Provider   acetaminophen (Tylenol 8 HOUR) 650 mg ER tablet Take 1 tablet (650 mg) by mouth every 8 hours if needed for mild pain (1 - 3). Do not crush, chew, or split.    Historical Provider, MD   amLODIPine (Norvasc) 10 mg tablet Take 1 tablet (10 mg) by mouth once daily. Do not start before January 1, 2024. 1/1/24 12/31/24  Viviana Moore MD   atorvastatin (Lipitor) 40 mg tablet Take 1 tablet (40 mg) by mouth once daily at bedtime. Do not start before January 1, 2024. 1/1/24 12/31/24   Viviana Moore MD   calcium carbonate/vitamin D3 (CALCIUM 600 + D,3, ORAL) Take 1 capsule by mouth 2 times a day.    Historical Provider, MD   cholecalciferol (Vitamin D3) 50 MCG (2000 UT) tablet Take 1 tablet (50 mcg) by mouth once daily.    Historical Provider, MD   diclofenac (Voltaren) 75 mg EC tablet Take 1 tablet (75 mg) by mouth 2 times a day as needed (pain). Do not crush, chew, or split. 11/20/23 11/19/24  Helen Arshad MD   diclofenac sodium 1 % kit APPLY 4 G OF GEL TO LOWER AFFECTED AREAS 4 TIMES A DAY NO MORE THAN 16 G TO ANY ONE AFFECTED JOINT 11/20/23   Helen Arshad MD   lisinopril 40 mg tablet Take 1 tablet (40 mg) by mouth once daily. Do not start before January 1, 2024. 1/1/24 12/31/24  Viviana Moore MD   methocarbamol (Robaxin) 750 mg tablet TAKE 1 TABLET 4 TIMES DAILY AS NEEDED. 11/20/23   Helen Arshad MD   oxybutynin XL (Ditropan-XL) 10 mg 24 hr tablet Take 1 tablet (10 mg) by mouth once daily. Do not start before January 1, 2024. 1/1/24 12/31/24  Viviana Moore MD   amLODIPine (Norvasc) 10 mg tablet Take 1 tablet (10 mg) by mouth once daily. 10/17/23 11/15/23  Viviana Moore MD   atorvastatin (Lipitor) 40 mg tablet Take 1 tablet (40 mg) by mouth once daily at bedtime. 6/18/20 11/15/23  Historical Provider, MD   diclofenac (Voltaren) 75 mg EC tablet one po bid 9/29/16 11/20/23  Historical Provider, MD   diclofenac sodium 1 % kit APPLY 4 G OF GEL TO LOWER AFFECTED AREAS 4 TIMES A DAY NO MORE THAN 16 G TO ANY ONE AFFECTED JOINT 1/3/22 11/20/23  Historical Provider, MD   lisinopril 40 mg tablet Take 1 tablet (40 mg) by mouth once daily.  11/15/23  Historical Provider, MD   methocarbamol (Robaxin) 750 mg tablet TAKE 1 TABLET 4 TIMES DAILY AS NEEDED. 4/18/22 11/20/23  Historical Provider, MD   oxybutynin XL (Ditropan-XL) 10 mg 24 hr tablet Take 1 tablet (10 mg) by mouth once daily. 10/11/18 11/15/23  Historical Provider, MD REA ROS:   Constitutional:   neg   "  Neuro/Psych:   neg    Eyes:   Ears:   Nose:   neg    Mouth:   Throat:   Neck:   Cardio:   neg    Respiratory:   neg    Endocrine:   GI:   neg    :   neg    Musculoskeletal:   neg    Hematologic:   neg    Skin:  neg        Physical Exam  Vitals reviewed.   Constitutional:       Appearance: Normal appearance.   HENT:      Head: Normocephalic and atraumatic.      Mouth/Throat:      Mouth: Mucous membranes are dry.   Eyes:      Extraocular Movements: Extraocular movements intact.      Pupils: Pupils are equal, round, and reactive to light.   Cardiovascular:      Rate and Rhythm: Normal rate and regular rhythm.      Pulses: Normal pulses.      Heart sounds: Normal heart sounds.   Pulmonary:      Effort: Pulmonary effort is normal.      Breath sounds: Normal breath sounds.   Musculoskeletal:      Comments: BILATERAL LOWER EXTREMITY BRACES   DECREASE RT SHOULDER    Skin:     General: Skin is warm and dry.   Neurological:      Mental Status: She is alert and oriented to person, place, and time.   Psychiatric:         Mood and Affect: Mood normal.         Behavior: Behavior normal.          PAT AIRWAY:   Airway:     Mallampati::  II   [place]       /81   Pulse 82   Temp 36.6 °C (97.9 °F)   Resp 18   Ht 1.499 m (4' 11\")   Wt 71 kg (156 lb 8.4 oz)   SpO2 96%   BMI 31.61 kg/m²     EKG   NSR   INFERIOR INFARCT AGE UNDETERMINED   POSS ANTERIOR INFARCT AGE UNDETERMINED     Lab Results   Component Value Date    WBC 7.9 11/21/2023    HGB 15.8 11/21/2023    HCT 49.9 (H) 11/21/2023    MCV 95 11/21/2023     11/21/2023     Lab Results   Component Value Date    GLUCOSE 86 03/31/2023    CALCIUM 9.2 03/31/2023     03/31/2023    K 4.1 03/31/2023    CO2 26 03/31/2023     03/31/2023    BUN 20 03/31/2023    CREATININE 0.67 03/31/2023         ASSESSMENT/PLAN    Patient is a 73 year-old  scheduled for Right Shoulder Reverse Replacement  with Dr. Vázquez   on  12/4/23 .    CARDIOVASCULAR:  RCRI score / Risk: " The patients score is 0 based on history . Per ACC/AHA guidelines this places her  at  3.9% risk for MACE undergoing a intermediate  risk procedure . The patient has the following risk factors: DENIES   Functional Capacity: The patients exercise tolerance is  4  METS. This is based on the patients abililty to ascend a flight of stairs  and walk 2 block w/o chest pain or other anginal equivalents.  . Patient denies  active cardiac symptoms or anginal equivalents .      PULMONARY:  The patient has the following factors that place them at increased risk of perioperative pulmonary complications;age greater than 65/BMI greater than 27/greater than 2.5 hour procedure.  Postoperatively the patient would benefit from early pulmonary toilet/incentive spirometry q 1-2 hours while awake/pulse oximetry/cautious use of respiratory depressant medications such as opioids/elevate the HOB/oral hygiene.      DVT:  CAPRINI SCORE=11  The patient has the following factors that increase her  Risk for thrombus formation ; Virchow's triad ,age>70, bmi>25, TSA, , HX DVT after B/L TKA , Surgical procedure >2 hrs  procedure .    Recommendations: DVT prophylaxis  per Dr. Vázquez  protocol . SCD's, BIJU's, and early ambulation are recommended. Heparin or LMWH is recommended for the very high risk .      Risk assessment complete.  Patient is scheduled for  intermediate  surgical risk procedure.  Patient is considered an acceptable  risk to proceed with the planned procedure.      Preoperative medication instructions were provided and reviewed with the patient.  Any additional testing or evaluation was explained to the patient.  Nothing by mouth instructions were discussed and patient's questions were answered prior to conclusion to this encounter.  Patient verbalized understanding of preoperative instructions given in preadmission testing; discharge instructions available in EMR.

## 2023-11-22 LAB
ATRIAL RATE: 73 BPM
P AXIS: 36 DEGREES
P OFFSET: 203 MS
P ONSET: 149 MS
PR INTERVAL: 142 MS
Q ONSET: 220 MS
QRS COUNT: 12 BEATS
QRS DURATION: 80 MS
QT INTERVAL: 412 MS
QTC CALCULATION(BAZETT): 453 MS
QTC FREDERICIA: 440 MS
R AXIS: 7 DEGREES
T AXIS: -6 DEGREES
T OFFSET: 426 MS
VENTRICULAR RATE: 73 BPM

## 2023-11-23 LAB — STAPHYLOCOCCUS SPEC CULT: NORMAL

## 2023-11-30 DIAGNOSIS — M70.62 TROCHANTERIC BURSITIS OF LEFT HIP: Primary | ICD-10-CM

## 2023-11-30 DIAGNOSIS — M25.511 RIGHT SHOULDER PAIN, UNSPECIFIED CHRONICITY: ICD-10-CM

## 2023-12-01 ENCOUNTER — HOSPITAL ENCOUNTER (OUTPATIENT)
Dept: RADIOLOGY | Facility: HOSPITAL | Age: 73
Discharge: HOME | End: 2023-12-01
Payer: COMMERCIAL

## 2023-12-01 DIAGNOSIS — M25.511 RIGHT SHOULDER PAIN, UNSPECIFIED CHRONICITY: ICD-10-CM

## 2023-12-01 PROCEDURE — 73030 X-RAY EXAM OF SHOULDER: CPT | Mod: RT

## 2023-12-01 PROCEDURE — 73030 X-RAY EXAM OF SHOULDER: CPT | Mod: RIGHT SIDE | Performed by: RADIOLOGY

## 2023-12-04 ENCOUNTER — ANESTHESIA (OUTPATIENT)
Dept: OPERATING ROOM | Facility: HOSPITAL | Age: 73
End: 2023-12-04
Payer: COMMERCIAL

## 2023-12-04 ENCOUNTER — ANESTHESIA EVENT (OUTPATIENT)
Dept: OPERATING ROOM | Facility: HOSPITAL | Age: 73
End: 2023-12-04
Payer: COMMERCIAL

## 2023-12-04 ENCOUNTER — HOSPITAL ENCOUNTER (OUTPATIENT)
Facility: HOSPITAL | Age: 73
Setting detail: OUTPATIENT SURGERY
Discharge: HOME | End: 2023-12-04
Attending: ORTHOPAEDIC SURGERY | Admitting: ORTHOPAEDIC SURGERY
Payer: COMMERCIAL

## 2023-12-04 ENCOUNTER — PHARMACY VISIT (OUTPATIENT)
Dept: PHARMACY | Facility: CLINIC | Age: 73
End: 2023-12-04
Payer: MEDICARE

## 2023-12-04 ENCOUNTER — HOME HEALTH ADMISSION (OUTPATIENT)
Dept: HOME HEALTH SERVICES | Facility: HOME HEALTH | Age: 73
End: 2023-12-04
Payer: COMMERCIAL

## 2023-12-04 ENCOUNTER — APPOINTMENT (OUTPATIENT)
Dept: RADIOLOGY | Facility: HOSPITAL | Age: 73
End: 2023-12-04
Payer: COMMERCIAL

## 2023-12-04 VITALS
WEIGHT: 169.97 LBS | BODY MASS INDEX: 34.27 KG/M2 | TEMPERATURE: 97.5 F | HEART RATE: 72 BPM | HEIGHT: 59 IN | RESPIRATION RATE: 16 BRPM | DIASTOLIC BLOOD PRESSURE: 78 MMHG | SYSTOLIC BLOOD PRESSURE: 132 MMHG | OXYGEN SATURATION: 96 %

## 2023-12-04 DIAGNOSIS — G89.18 ACUTE POST-OPERATIVE PAIN: Primary | ICD-10-CM

## 2023-12-04 DIAGNOSIS — M19.011 PRIMARY OSTEOARTHRITIS, RIGHT SHOULDER: ICD-10-CM

## 2023-12-04 PROBLEM — Z98.890 PONV (POSTOPERATIVE NAUSEA AND VOMITING): Status: ACTIVE | Noted: 2023-12-04

## 2023-12-04 PROBLEM — R11.2 PONV (POSTOPERATIVE NAUSEA AND VOMITING): Status: ACTIVE | Noted: 2023-12-04

## 2023-12-04 LAB
ABO GROUP (TYPE) IN BLOOD: NORMAL
RH FACTOR (ANTIGEN D): NORMAL

## 2023-12-04 PROCEDURE — 7100000001 HC RECOVERY ROOM TIME - INITIAL BASE CHARGE: Performed by: ORTHOPAEDIC SURGERY

## 2023-12-04 PROCEDURE — 2500000001 HC RX 250 WO HCPCS SELF ADMINISTERED DRUGS (ALT 637 FOR MEDICARE OP): Performed by: STUDENT IN AN ORGANIZED HEALTH CARE EDUCATION/TRAINING PROGRAM

## 2023-12-04 PROCEDURE — 36415 COLL VENOUS BLD VENIPUNCTURE: CPT | Performed by: ORTHOPAEDIC SURGERY

## 2023-12-04 PROCEDURE — A23472 PR RECONSTR TOTAL SHOULDER IMPLANT: Performed by: ANESTHESIOLOGY

## 2023-12-04 PROCEDURE — 2500000004 HC RX 250 GENERAL PHARMACY W/ HCPCS (ALT 636 FOR OP/ED): Performed by: ANESTHESIOLOGIST ASSISTANT

## 2023-12-04 PROCEDURE — 2500000005 HC RX 250 GENERAL PHARMACY W/O HCPCS: Performed by: ANESTHESIOLOGIST ASSISTANT

## 2023-12-04 PROCEDURE — A23472 PR RECONSTR TOTAL SHOULDER IMPLANT: Performed by: ANESTHESIOLOGIST ASSISTANT

## 2023-12-04 PROCEDURE — 97535 SELF CARE MNGMENT TRAINING: CPT | Mod: GO | Performed by: OCCUPATIONAL THERAPIST

## 2023-12-04 PROCEDURE — 97116 GAIT TRAINING THERAPY: CPT | Mod: GP

## 2023-12-04 PROCEDURE — C1776 JOINT DEVICE (IMPLANTABLE): HCPCS | Performed by: ORTHOPAEDIC SURGERY

## 2023-12-04 PROCEDURE — 3600000005 HC OR TIME - INITIAL BASE CHARGE - PROCEDURE LEVEL FIVE: Performed by: ORTHOPAEDIC SURGERY

## 2023-12-04 PROCEDURE — 23472 RECONSTRUCT SHOULDER JOINT: CPT | Performed by: ORTHOPAEDIC SURGERY

## 2023-12-04 PROCEDURE — 73020 X-RAY EXAM OF SHOULDER: CPT | Mod: RIGHT SIDE | Performed by: RADIOLOGY

## 2023-12-04 PROCEDURE — 7100000010 HC PHASE TWO TIME - EACH INCREMENTAL 1 MINUTE: Performed by: ORTHOPAEDIC SURGERY

## 2023-12-04 PROCEDURE — 2500000005 HC RX 250 GENERAL PHARMACY W/O HCPCS: Performed by: STUDENT IN AN ORGANIZED HEALTH CARE EDUCATION/TRAINING PROGRAM

## 2023-12-04 PROCEDURE — RXMED WILLOW AMBULATORY MEDICATION CHARGE

## 2023-12-04 PROCEDURE — 2780000003 HC OR 278 NO HCPCS: Performed by: ORTHOPAEDIC SURGERY

## 2023-12-04 PROCEDURE — 3700000001 HC GENERAL ANESTHESIA TIME - INITIAL BASE CHARGE: Performed by: ORTHOPAEDIC SURGERY

## 2023-12-04 PROCEDURE — 7100000002 HC RECOVERY ROOM TIME - EACH INCREMENTAL 1 MINUTE: Performed by: ORTHOPAEDIC SURGERY

## 2023-12-04 PROCEDURE — 64415 NJX AA&/STRD BRCH PLXS IMG: CPT | Performed by: ANESTHESIOLOGY

## 2023-12-04 PROCEDURE — C1713 ANCHOR/SCREW BN/BN,TIS/BN: HCPCS | Performed by: ORTHOPAEDIC SURGERY

## 2023-12-04 PROCEDURE — 2720000007 HC OR 272 NO HCPCS: Performed by: ORTHOPAEDIC SURGERY

## 2023-12-04 PROCEDURE — 99100 ANES PT EXTEME AGE<1 YR&>70: CPT | Performed by: ANESTHESIOLOGY

## 2023-12-04 PROCEDURE — 3700000002 HC GENERAL ANESTHESIA TIME - EACH INCREMENTAL 1 MINUTE: Performed by: ORTHOPAEDIC SURGERY

## 2023-12-04 PROCEDURE — 97530 THERAPEUTIC ACTIVITIES: CPT | Mod: GO | Performed by: OCCUPATIONAL THERAPIST

## 2023-12-04 PROCEDURE — 2500000004 HC RX 250 GENERAL PHARMACY W/ HCPCS (ALT 636 FOR OP/ED): Performed by: STUDENT IN AN ORGANIZED HEALTH CARE EDUCATION/TRAINING PROGRAM

## 2023-12-04 PROCEDURE — 97165 OT EVAL LOW COMPLEX 30 MIN: CPT | Mod: GO | Performed by: OCCUPATIONAL THERAPIST

## 2023-12-04 PROCEDURE — 3600000010 HC OR TIME - EACH INCREMENTAL 1 MINUTE - PROCEDURE LEVEL FIVE: Performed by: ORTHOPAEDIC SURGERY

## 2023-12-04 PROCEDURE — 73020 X-RAY EXAM OF SHOULDER: CPT | Mod: RT,FR

## 2023-12-04 PROCEDURE — 97161 PT EVAL LOW COMPLEX 20 MIN: CPT | Mod: GP

## 2023-12-04 PROCEDURE — 7100000009 HC PHASE TWO TIME - INITIAL BASE CHARGE: Performed by: ORTHOPAEDIC SURGERY

## 2023-12-04 DEVICE — GLENOID PIN, TARGETER, 2.8MM, STAINLESS: Type: IMPLANTABLE DEVICE | Site: SHOULDER | Status: FUNCTIONAL

## 2023-12-04 DEVICE — 5.5X28MM PERIPHERAL SCREW, LOCKING
Type: IMPLANTABLE DEVICE | Site: SHOULDER | Status: FUNCTIONAL
Brand: ARTHREX®

## 2023-12-04 DEVICE — IMPLANTABLE DEVICE: Type: IMPLANTABLE DEVICE | Site: SHOULDER | Status: FUNCTIONAL

## 2023-12-04 DEVICE — 24MM +2 LAT BASEPLATE, MODULAR
Type: IMPLANTABLE DEVICE | Site: SHOULDER | Status: FUNCTIONAL
Brand: ARTHREX®

## 2023-12-04 DEVICE — 5.5X24MM PERIPHERAL SCREW, LOCKING
Type: IMPLANTABLE DEVICE | Site: SHOULDER | Status: FUNCTIONAL
Brand: ARTHREX®

## 2023-12-04 DEVICE — UNIVERS REVERS HUMERAL STEM, 5MM
Type: IMPLANTABLE DEVICE | Site: SHOULDER | Status: FUNCTIONAL
Brand: ARTHREX®

## 2023-12-04 DEVICE — 33 +4 LAT/24 GLENOSPHERE
Type: IMPLANTABLE DEVICE | Site: SHOULDER | Status: FUNCTIONAL
Brand: ARTHREX®

## 2023-12-04 DEVICE — 5.5X20MM PERIPHERAL SCREW, LOCKING
Type: IMPLANTABLE DEVICE | Site: SHOULDER | Status: FUNCTIONAL
Brand: ARTHREX®

## 2023-12-04 RX ORDER — OXYCODONE HYDROCHLORIDE 5 MG/1
10 TABLET ORAL ONCE
Status: DISCONTINUED | OUTPATIENT
Start: 2023-12-04 | End: 2023-12-04 | Stop reason: HOSPADM

## 2023-12-04 RX ORDER — ROCURONIUM BROMIDE 10 MG/ML
INJECTION, SOLUTION INTRAVENOUS AS NEEDED
Status: DISCONTINUED | OUTPATIENT
Start: 2023-12-04 | End: 2023-12-04

## 2023-12-04 RX ORDER — NALOXONE HYDROCHLORIDE 1 MG/ML
0.2 INJECTION INTRAMUSCULAR; INTRAVENOUS; SUBCUTANEOUS EVERY 5 MIN PRN
Status: DISCONTINUED | OUTPATIENT
Start: 2023-12-04 | End: 2023-12-04 | Stop reason: HOSPADM

## 2023-12-04 RX ORDER — LIDOCAINE HYDROCHLORIDE 20 MG/ML
INJECTION, SOLUTION EPIDURAL; INFILTRATION; INTRACAUDAL; PERINEURAL AS NEEDED
Status: DISCONTINUED | OUTPATIENT
Start: 2023-12-04 | End: 2023-12-04

## 2023-12-04 RX ORDER — CEFAZOLIN SODIUM 2 G/100ML
2 INJECTION, SOLUTION INTRAVENOUS ONCE
Status: COMPLETED | OUTPATIENT
Start: 2023-12-04 | End: 2023-12-04

## 2023-12-04 RX ORDER — HYDROMORPHONE HYDROCHLORIDE 1 MG/ML
1 INJECTION, SOLUTION INTRAMUSCULAR; INTRAVENOUS; SUBCUTANEOUS EVERY 2 HOUR PRN
Status: DISCONTINUED | OUTPATIENT
Start: 2023-12-04 | End: 2023-12-04 | Stop reason: HOSPADM

## 2023-12-04 RX ORDER — PREGABALIN 75 MG/1
75 CAPSULE ORAL ONCE
Status: COMPLETED | OUTPATIENT
Start: 2023-12-04 | End: 2023-12-04

## 2023-12-04 RX ORDER — ONDANSETRON HYDROCHLORIDE 2 MG/ML
INJECTION, SOLUTION INTRAVENOUS AS NEEDED
Status: DISCONTINUED | OUTPATIENT
Start: 2023-12-04 | End: 2023-12-04

## 2023-12-04 RX ORDER — KETOROLAC TROMETHAMINE 10 MG/1
10 TABLET, FILM COATED ORAL EVERY 6 HOURS PRN
Qty: 12 TABLET | Refills: 0 | Status: SHIPPED | OUTPATIENT
Start: 2023-12-04 | End: 2024-01-03 | Stop reason: ALTCHOICE

## 2023-12-04 RX ORDER — OXYCODONE HYDROCHLORIDE 5 MG/1
5 TABLET ORAL EVERY 6 HOURS PRN
Qty: 28 TABLET | Refills: 0 | Status: SHIPPED | OUTPATIENT
Start: 2023-12-04 | End: 2023-12-11

## 2023-12-04 RX ORDER — MELOXICAM 7.5 MG/1
7.5 TABLET ORAL ONCE
Status: COMPLETED | OUTPATIENT
Start: 2023-12-04 | End: 2023-12-04

## 2023-12-04 RX ORDER — ALUMINUM HYDROXIDE, MAGNESIUM HYDROXIDE, AND SIMETHICONE 2400; 240; 2400 MG/30ML; MG/30ML; MG/30ML
5 SUSPENSION ORAL ONCE AS NEEDED
Status: DISCONTINUED | OUTPATIENT
Start: 2023-12-04 | End: 2023-12-04

## 2023-12-04 RX ORDER — PHENYLEPHRINE HCL IN 0.9% NACL 1 MG/10 ML
SYRINGE (ML) INTRAVENOUS AS NEEDED
Status: DISCONTINUED | OUTPATIENT
Start: 2023-12-04 | End: 2023-12-04

## 2023-12-04 RX ORDER — DOCUSATE SODIUM 100 MG/1
100 CAPSULE, LIQUID FILLED ORAL 2 TIMES DAILY
Qty: 60 CAPSULE | Refills: 0 | Status: SHIPPED | OUTPATIENT
Start: 2023-12-04 | End: 2024-01-03 | Stop reason: ALTCHOICE

## 2023-12-04 RX ORDER — MIDAZOLAM HYDROCHLORIDE 1 MG/ML
INJECTION, SOLUTION INTRAMUSCULAR; INTRAVENOUS AS NEEDED
Status: DISCONTINUED | OUTPATIENT
Start: 2023-12-04 | End: 2023-12-04

## 2023-12-04 RX ORDER — NEOSTIGMINE METHYLSULFATE 0.5 MG/ML
INJECTION, SOLUTION INTRAVENOUS AS NEEDED
Status: DISCONTINUED | OUTPATIENT
Start: 2023-12-04 | End: 2023-12-04

## 2023-12-04 RX ORDER — OXYCODONE HYDROCHLORIDE 5 MG/1
5 TABLET ORAL EVERY 4 HOURS PRN
Status: DISCONTINUED | OUTPATIENT
Start: 2023-12-04 | End: 2023-12-04 | Stop reason: HOSPADM

## 2023-12-04 RX ORDER — ACETAMINOPHEN 500 MG
1000 TABLET ORAL EVERY 6 HOURS
Qty: 240 TABLET | Refills: 0 | Status: SHIPPED | OUTPATIENT
Start: 2023-12-04 | End: 2024-01-03 | Stop reason: ALTCHOICE

## 2023-12-04 RX ORDER — ALUMINUM HYDROXIDE, MAGNESIUM HYDROXIDE, AND SIMETHICONE 1200; 120; 1200 MG/30ML; MG/30ML; MG/30ML
30 SUSPENSION ORAL ONCE AS NEEDED
Status: DISCONTINUED | OUTPATIENT
Start: 2023-12-04 | End: 2023-12-04 | Stop reason: HOSPADM

## 2023-12-04 RX ORDER — OXYCODONE HYDROCHLORIDE 5 MG/1
10 TABLET ORAL EVERY 4 HOURS PRN
Status: DISCONTINUED | OUTPATIENT
Start: 2023-12-04 | End: 2023-12-04 | Stop reason: HOSPADM

## 2023-12-04 RX ORDER — PROPOFOL 10 MG/ML
INJECTION, EMULSION INTRAVENOUS AS NEEDED
Status: DISCONTINUED | OUTPATIENT
Start: 2023-12-04 | End: 2023-12-04

## 2023-12-04 RX ORDER — ONDANSETRON 4 MG/1
4 TABLET, FILM COATED ORAL EVERY 8 HOURS PRN
Qty: 30 TABLET | Refills: 0 | Status: SHIPPED | OUTPATIENT
Start: 2023-12-04 | End: 2024-01-03 | Stop reason: ALTCHOICE

## 2023-12-04 RX ORDER — SODIUM CHLORIDE, SODIUM LACTATE, POTASSIUM CHLORIDE, CALCIUM CHLORIDE 600; 310; 30; 20 MG/100ML; MG/100ML; MG/100ML; MG/100ML
100 INJECTION, SOLUTION INTRAVENOUS CONTINUOUS
Status: DISCONTINUED | OUTPATIENT
Start: 2023-12-04 | End: 2023-12-04 | Stop reason: HOSPADM

## 2023-12-04 RX ORDER — POLYETHYLENE GLYCOL 3350 17 G/17G
17 POWDER, FOR SOLUTION ORAL DAILY
Qty: 238 G | Refills: 0 | Status: SHIPPED | OUTPATIENT
Start: 2023-12-04 | End: 2024-01-03 | Stop reason: ALTCHOICE

## 2023-12-04 RX ORDER — TRANEXAMIC ACID 100 MG/ML
INJECTION, SOLUTION INTRAVENOUS AS NEEDED
Status: DISCONTINUED | OUTPATIENT
Start: 2023-12-04 | End: 2023-12-04

## 2023-12-04 RX ORDER — CYCLOBENZAPRINE HCL 10 MG
10 TABLET ORAL ONCE AS NEEDED
Status: DISCONTINUED | OUTPATIENT
Start: 2023-12-04 | End: 2023-12-04 | Stop reason: HOSPADM

## 2023-12-04 RX ORDER — ACETAMINOPHEN 325 MG/1
650 TABLET ORAL EVERY 4 HOURS PRN
Status: DISCONTINUED | OUTPATIENT
Start: 2023-12-04 | End: 2023-12-04 | Stop reason: HOSPADM

## 2023-12-04 RX ORDER — TRANEXAMIC ACID 650 MG/1
1950 TABLET ORAL ONCE
Status: COMPLETED | OUTPATIENT
Start: 2023-12-04 | End: 2023-12-04

## 2023-12-04 RX ORDER — ASPIRIN 81 MG/1
81 TABLET ORAL 2 TIMES DAILY
Qty: 60 TABLET | Refills: 0 | Status: SHIPPED | OUTPATIENT
Start: 2023-12-04 | End: 2024-01-03 | Stop reason: ALTCHOICE

## 2023-12-04 RX ORDER — ACETAMINOPHEN 325 MG/1
975 TABLET ORAL ONCE
Status: DISCONTINUED | OUTPATIENT
Start: 2023-12-04 | End: 2023-12-04 | Stop reason: HOSPADM

## 2023-12-04 RX ORDER — PANTOPRAZOLE SODIUM 40 MG/1
40 TABLET, DELAYED RELEASE ORAL
Qty: 30 TABLET | Refills: 0 | Status: SHIPPED | OUTPATIENT
Start: 2023-12-04 | End: 2024-01-03 | Stop reason: ALTCHOICE

## 2023-12-04 RX ORDER — FENTANYL CITRATE 50 UG/ML
INJECTION, SOLUTION INTRAMUSCULAR; INTRAVENOUS AS NEEDED
Status: DISCONTINUED | OUTPATIENT
Start: 2023-12-04 | End: 2023-12-04

## 2023-12-04 RX ORDER — DEXAMETHASONE SODIUM PHOSPHATE 4 MG/ML
INJECTION, SOLUTION INTRA-ARTICULAR; INTRALESIONAL; INTRAMUSCULAR; INTRAVENOUS; SOFT TISSUE AS NEEDED
Status: DISCONTINUED | OUTPATIENT
Start: 2023-12-04 | End: 2023-12-04

## 2023-12-04 RX ORDER — KETOROLAC TROMETHAMINE 30 MG/ML
15 INJECTION, SOLUTION INTRAMUSCULAR; INTRAVENOUS ONCE
Status: DISCONTINUED | OUTPATIENT
Start: 2023-12-04 | End: 2023-12-04 | Stop reason: HOSPADM

## 2023-12-04 RX ADMIN — Medication 300 MCG: at 09:28

## 2023-12-04 RX ADMIN — DEXAMETHASONE SODIUM PHOSPHATE 4 MG: 4 INJECTION, SOLUTION INTRAMUSCULAR; INTRAVENOUS at 08:15

## 2023-12-04 RX ADMIN — NEOSTIGMINE METHYLSULFATE 2.5 MG: 0.5 INJECTION INTRAVENOUS at 09:29

## 2023-12-04 RX ADMIN — MELOXICAM 7.5 MG: 7.5 TABLET ORAL at 07:00

## 2023-12-04 RX ADMIN — Medication 300 MCG: at 09:14

## 2023-12-04 RX ADMIN — Medication 200 MCG: at 08:25

## 2023-12-04 RX ADMIN — Medication 300 MCG: at 08:30

## 2023-12-04 RX ADMIN — TRANEXAMIC ACID 1950 MG: 650 TABLET ORAL at 11:41

## 2023-12-04 RX ADMIN — TRANEXAMIC ACID 1000 MG: 1 INJECTION, SOLUTION INTRAVENOUS at 08:10

## 2023-12-04 RX ADMIN — ROCURONIUM BROMIDE 60 MG: 10 INJECTION, SOLUTION INTRAVENOUS at 08:04

## 2023-12-04 RX ADMIN — CEFAZOLIN SODIUM 2 G: 2 INJECTION, SOLUTION INTRAVENOUS at 08:10

## 2023-12-04 RX ADMIN — PROPOFOL 150 MG: 10 INJECTION, EMULSION INTRAVENOUS at 08:04

## 2023-12-04 RX ADMIN — Medication 300 MCG: at 08:47

## 2023-12-04 RX ADMIN — SODIUM CHLORIDE, SODIUM LACTATE, POTASSIUM CHLORIDE, AND CALCIUM CHLORIDE: 600; 310; 30; 20 INJECTION, SOLUTION INTRAVENOUS at 07:18

## 2023-12-04 RX ADMIN — Medication 300 MCG: at 08:20

## 2023-12-04 RX ADMIN — SODIUM CHLORIDE, POTASSIUM CHLORIDE, SODIUM LACTATE AND CALCIUM CHLORIDE 100 ML/HR: 600; 310; 30; 20 INJECTION, SOLUTION INTRAVENOUS at 07:02

## 2023-12-04 RX ADMIN — LIDOCAINE HYDROCHLORIDE 60 MG: 20 INJECTION, SOLUTION EPIDURAL; INFILTRATION; INTRACAUDAL; PERINEURAL at 08:04

## 2023-12-04 RX ADMIN — PREGABALIN 75 MG: 75 CAPSULE ORAL at 07:00

## 2023-12-04 RX ADMIN — GLYCOPYRROLATE 0.4 MCG: 0.2 INJECTION, SOLUTION INTRAMUSCULAR; INTRAVITREAL at 09:38

## 2023-12-04 RX ADMIN — CEFAZOLIN SODIUM 2 G: 2 INJECTION, SOLUTION INTRAVENOUS at 15:32

## 2023-12-04 RX ADMIN — SODIUM CHLORIDE, SODIUM LACTATE, POTASSIUM CHLORIDE, AND CALCIUM CHLORIDE: 600; 310; 30; 20 INJECTION, SOLUTION INTRAVENOUS at 08:57

## 2023-12-04 RX ADMIN — FENTANYL CITRATE 50 MCG: 50 INJECTION, SOLUTION INTRAMUSCULAR; INTRAVENOUS at 08:04

## 2023-12-04 RX ADMIN — ONDANSETRON 4 MG: 2 INJECTION INTRAMUSCULAR; INTRAVENOUS at 09:23

## 2023-12-04 RX ADMIN — NEOSTIGMINE METHYLSULFATE 1.5 MG: 0.5 INJECTION INTRAVENOUS at 09:38

## 2023-12-04 RX ADMIN — MIDAZOLAM HYDROCHLORIDE 2 MG: 1 INJECTION, SOLUTION INTRAMUSCULAR; INTRAVENOUS at 07:15

## 2023-12-04 RX ADMIN — Medication 300 MCG: at 08:56

## 2023-12-04 RX ADMIN — Medication 200 MCG: at 08:11

## 2023-12-04 RX ADMIN — PROPOFOL 50 MG: 10 INJECTION, EMULSION INTRAVENOUS at 08:09

## 2023-12-04 RX ADMIN — FENTANYL CITRATE 50 MCG: 50 INJECTION, SOLUTION INTRAMUSCULAR; INTRAVENOUS at 09:44

## 2023-12-04 RX ADMIN — GLYCOPYRROLATE 0.8 MCG: 0.2 INJECTION, SOLUTION INTRAMUSCULAR; INTRAVITREAL at 09:29

## 2023-12-04 RX ADMIN — Medication 300 MCG: at 09:03

## 2023-12-04 RX ADMIN — POVIDONE-IODINE 1 APPLICATION: 5 SOLUTION TOPICAL at 07:03

## 2023-12-04 RX ADMIN — Medication 200 MCG: at 08:37

## 2023-12-04 RX ADMIN — Medication 300 MCG: at 08:15

## 2023-12-04 ASSESSMENT — COGNITIVE AND FUNCTIONAL STATUS - GENERAL
TURNING FROM BACK TO SIDE WHILE IN FLAT BAD: A LITTLE
MOVING TO AND FROM BED TO CHAIR: A LITTLE
STANDING UP FROM CHAIR USING ARMS: A LITTLE
MOBILITY SCORE: 19
WALKING IN HOSPITAL ROOM: A LITTLE
CLIMB 3 TO 5 STEPS WITH RAILING: A LITTLE

## 2023-12-04 ASSESSMENT — PAIN SCALES - GENERAL
PAINLEVEL_OUTOF10: 0 - NO PAIN
PAIN_LEVEL: 0
PAINLEVEL_OUTOF10: 0 - NO PAIN

## 2023-12-04 ASSESSMENT — COLUMBIA-SUICIDE SEVERITY RATING SCALE - C-SSRS
2. HAVE YOU ACTUALLY HAD ANY THOUGHTS OF KILLING YOURSELF?: NO
6. HAVE YOU EVER DONE ANYTHING, STARTED TO DO ANYTHING, OR PREPARED TO DO ANYTHING TO END YOUR LIFE?: NO
1. IN THE PAST MONTH, HAVE YOU WISHED YOU WERE DEAD OR WISHED YOU COULD GO TO SLEEP AND NOT WAKE UP?: NO

## 2023-12-04 ASSESSMENT — PAIN - FUNCTIONAL ASSESSMENT

## 2023-12-04 ASSESSMENT — ACTIVITIES OF DAILY LIVING (ADL): HOME_MANAGEMENT_TIME_ENTRY: 18

## 2023-12-04 NOTE — PROGRESS NOTES
Occupational Therapy    Evaluation    Patient Name: Kimberly Segundo  MRN: [id number]  Today's Date: 12/4/2023  Time Calculation  Start Time: 1319  Stop Time: 1420  Time Calculation (min): 61 min        Subjective   General  Reason for Referral: Pt is POD#0 R reverse TSR.  Referred By: Dr. Vázquez  Family/Caregiver Present: Yes (spouse)  General Comment: Pt seen in PACU for OT evaluation s/p R reverse TSR. See paper chart for OT evaluation/treatment.

## 2023-12-04 NOTE — OP NOTE
Right Shoulder Reverse Replacement (R) Operative Note     Date: 2023  OR Location: [facility] A OR    Name: Kimberly Segundo, : 1950, Age: 73 y.o., MRN: [id number], Sex: female    Diagnosis  Pre-op Diagnosis     * Primary osteoarthritis, right shoulder [M19.011] Post-op Diagnosis     * Primary osteoarthritis, right shoulder [M19.011]     Procedures  Right Shoulder Reverse Replacement  [id number] - AK ARTHROPLASTY GLENOHUMERAL JOINT TOTAL SHOULDER      Surgeons      * Dale Vázquez - Primary    Resident/Fellow/Other Assistant:  Surgeon(s) and Role:     * Benoit Hoskins MD - Resident - Assisting    Procedure Summary  Anesthesia: Consult  ASA: II  Anesthesia Staff: Anesthesiologist: Jaciel Coyle MD  C-AA: ISABEL Mccarty  Estimated Blood Loss: 50mL  Intra-op Medications:   Medication Name Total Dose   ceFAZolin in dextrose (iso-os) (Ancef) IVPB 2 g 2 g              Anesthesia Record               Intraprocedure I/O Totals          Intake    Neostigmine 0.00 mL    The total shown is the total volume documented since Anesthesia Start was filed.    LR 1000.00 mL    ceFAZolin in dextrose (iso-os) (Ancef) IVPB 2 g 10.00 mL    Total Intake 1010 mL          Specimen: No specimens collected     Staff:   Circulator: Victoria Nichols RN  Relief Circulator: Ana Maria Fernando RN  Scrub Person: Penny Tyler; Richie Fountain         Drains and/or Catheters: * None in log *    Tourniquet Times:         Implants:  Implants       Type Name Action Serial No.      Joint GLENOID PIN, TARGETER, 2.8MM, STAINLESS - SNA - [id number] Implanted NA     Joint SCREW, MODULAR CENTRAL, 25MM - SNA - [id number] Implanted NA     Joint BASEPLATE, GLENIOD, MODULAR, 24MM +2 LAT - SNA - [id number] Implanted NA     Screw SCREW, LOCKING, 5.5MM X 28MM - SNA - [id number] Implanted NA     Screw SCREW, LOCKING, 5.5MM X 24MM - SNA - [id number] Implanted NA     Screw SCREW, LOCKING, 5.5MM X 20MM - SNA - [id number] Implanted NA     Joint GLENOSPHERE,  LATERALIZED, 33+ 4 , 24MM BASEPLATE TAPER - SNA - [id number] Implanted NA     Implant UNIVERS REVERS SURTURE CUP 33 NEUTRAL Implanted NA     Joint STEM, HUMERAL, UNIVERS REVERS, 5MM - SNA - [id number] Implanted NA     Implant MODULAR GLENOID SYSTEM HUMERAL INSERT 33+6, CONSTRAINED Implanted NA              Findings: marked deformity humeral head    Indications: Kimberly Segundo is an 73 y.o. female who is having surgery for Primary osteoarthritis, right shoulder [M19.011].     The patient was seen in the preoperative area. The risks, benefits, complications, treatment options, non-operative alternatives, expected recovery and outcomes were discussed with the patient. The possibilities of reaction to medication, pulmonary aspiration, injury to surrounding structures, bleeding, recurrent infection, the need for additional procedures, failure to diagnose a condition, and creating a complication requiring transfusion or operation were discussed with the patient. The patient concurred with the proposed plan, giving informed consent.  The site of surgery was properly noted/marked if necessary per policy. The patient has been actively warmed in preoperative area. Preoperative antibiotics have been ordered and given within 1 hours of incision. Venous thrombosis prophylaxis have been ordered including bilateral sequential compression devices    Procedure Details: Preoperative huddle was performed patient identification procedure and site verification.  Patient given prophylactic antibiotics and tranexamic acid.  She was positioned in the beachchair position.  The shoulder and arm were sterilely prepped and draped usual fashion.  Standard deltopectoral approach was made.  Cephalic vein was identified and protected.  It was retracted laterally.  Bulging capsule essentially absent subscapularis as well as absence of rotator cuff were noted the markedly deformed or to the anterior tissues were taken down and reflected incision  distally the incision was carried up to the bicipital groove the biceps tendon was absent.  The humeral head was dislocated deep retractors were placed.  The neck cut was made freehand using a sagittal saw.  The head protector was then placed and attention was focused on the glenoid labrum and capsule or freed.  There is some minor anterior bony deficiency involving the anterior 10 to 20% of the glenoid.  The [employer] system was utilized.  Tolerating place was placed in the central portion of the glenoid.  A +224 mm/small glenoid baseplate was secured after preparation of the glenoid central screw as well as 3 locking screws were utilized 1 superior 1 inferior and 1 posterior.  Good bony apposition was obtained.  A 33+4 x 24 mm glenosphere was then cold welded in place.  The central locking screw was then placed as well.  Next the humerus was prepared in the standard fashion and after trials a 5 mm humeral stem was used with a 33+6 modular humeral insert 33 neutral suture cup.  This was quite a stable construct that afforded good mobility.  The wound was then irrigated and closed in layers the deltopectoral interval was tacked together.  The skin was closed in running subicular suture sterile dressings were applied the patient was placed in a shoulder immobilizer.  Complications:  None; patient tolerated the procedure well.    Disposition: PACU - hemodynamically stable.  Condition: stable         Additional Details:     Attending Attestation: I was present for the entire procedure.    Dale Vázquez  Phone Number: [phone]

## 2023-12-04 NOTE — DISCHARGE INSTRUCTIONS
Additional instructions  Ice operative extremity.  Keep dressing clean and dry.    Keep dressing in place.  Weightbearing: NWB on operative extremity in sling  Oxycodone by mouth every 6 hours as needed for pain.  Start Tylenol by mouth every 6 hours as needed for pain.  Aspirin by mouth twice daily.  F/U with Dr. Vázquez in 2 weeks.

## 2023-12-04 NOTE — ANESTHESIA PROCEDURE NOTES
Airway  Date/Time: 12/4/2023 8:05 AM  Urgency: elective    Airway not difficult    Staffing  Performed: ISABEL   Authorized by: Jaciel Coyle MD    Performed by: ISABEL Mccarty  Patient location during procedure: OR    Indications and Patient Condition  Indications for airway management: anesthesia  Spontaneous Ventilation: absent  Sedation level: deep  Preoxygenated: yes  Patient position: sniffing  MILS maintained throughout  Mask difficulty assessment: 1 - vent by mask    Final Airway Details  Final airway type: endotracheal airway      Successful airway: ETT  Cuffed: yes   Successful intubation technique: direct laryngoscopy  Blade: [place]  Blade size: #3  ETT size (mm): 7.0  Cormack-Lehane Classification: grade I - full view of glottis  Placement verified by: chest auscultation and capnometry   Measured from: lips  ETT to lips (cm): 21  Number of attempts at approach: 1

## 2023-12-04 NOTE — ANESTHESIA POSTPROCEDURE EVALUATION
Patient: Kimberly Segundo    Procedure Summary       Date: 12/04/23 Room / Location: [facility] A OR 11 / [facility] OR    Anesthesia Start: 0750 Anesthesia Stop: 0948    Procedure: Right Shoulder Reverse Replacement (Right: Shoulder) Diagnosis:       Primary osteoarthritis, right shoulder      (Primary osteoarthritis, right shoulder [M19.011])    Surgeons: Dale Vázquez MD Responsible Provider: Jaciel Coyle MD    Anesthesia Type: general ASA Status: 2            Anesthesia Type: general    Vitals Value Taken Time   /54 12/04/23 0953   Temp 36.3 12/04/23 0953   Pulse 71 12/04/23 0953   Resp 17 12/04/23 0953   SpO2 98 12/04/23 0953       Anesthesia Post Evaluation    Patient location during evaluation: bedside  Patient participation: complete - patient cannot participate  Level of consciousness: awake  Pain score: 0  Pain management: adequate  Airway patency: patent  Cardiovascular status: acceptable  Respiratory status: acceptable  Hydration status: acceptable  Postoperative Nausea and Vomiting: none        No notable events documented.

## 2023-12-04 NOTE — ANESTHESIA PREPROCEDURE EVALUATION
Patient: Kimberly Segundo    Procedure Information       Date/Time: 12/04/23 0730    Procedure: Right Shoulder Reverse Replacement (Right: Shoulder) - **POD**    Location: [facility] OR  / [facility] OR    Surgeons: Dale Vázquez MD            Relevant Problems   Anesthesia   (+) PONV (postoperative nausea and vomiting)      Cardiovascular   (+) Benign essential hypertension   (+) Mixed hyperlipidemia      Endocrine (within normal limits)      GI (within normal limits)      /Renal (within normal limits)      Neuro/Psych (within normal limits)      GI/Hepatic (within normal limits)      Musculoskeletal   (+) Generalized osteoarthritis      Eyes, Ears, Nose, and Throat   (+) Hearing loss   (+) Sensorineural hearing loss, bilateral      Other   (+) Arthritis, lumbar spine       Clinical information reviewed:    Allergies  Meds     OB Status           NPO Detail:  NPO/Void Status  Carbonhydrate Drink Given Prior to Surgery? : N  Date of Last Liquid: 12/04/23  Time of Last Liquid: 0430  Date of Last Solid: 12/03/23  Time of Last Solid: 1930  Last Intake Type: Clear fluids  Time of Last Void: 0641         Physical Exam    Airway  Mallampati: I     Cardiovascular    Dental    Pulmonary    Abdominal            Anesthesia Plan    ASA 2     general     intravenous induction   Anesthetic plan and risks discussed with patient.

## 2023-12-04 NOTE — SIGNIFICANT EVENT
PT RECEIVED FROM OR.  PLACED ON CONTINUOUS CARDIAC / SAO2 MONITOR.  REPORT RECEIVED FROM OR TEAM.  RIGHT SHOULDER DRESSING DRY AND INTACT.  ICE PACK APPLIED.  SLING MAINTAINED.

## 2023-12-04 NOTE — PROGRESS NOTES
Physical Therapy    Physical Therapy Evaluation & Treatment    Patient Name: Kimberly Segundo  MRN: [id number]  Today's Date: 12/4/2023   Time Calculation  Start Time: 1446  Stop Time: 1504  Time Calculation (min): 18 min    Assessment/Plan   PT Assessment  PT Assessment Results: Orthopedic restrictions  Evaluation/Treatment Tolerance: Patient tolerated treatment well  Medical Staff Made Aware: Yes  End of Session Communication: Bedside nurse  Assessment Comment: PT eval completed. Pt became more steady with increased practice using cane in L hand. Spouse will be available to assist 24/7 as needed.  End of Session Patient Position: Up in chair   IP OR SWING BED PT PLAN  Inpatient or Swing Bed: Inpatient  PT Plan  PT Plan: PT Eval only  PT Eval Only Reason: Safe to return home  PT Frequency: PT eval only  PT Discharge Recommendations: No PT needed after discharge  PT - OK to Discharge: Yes      Subjective     General Visit Information:  General  Reason for Referral: Pt seen POD #0 s/p R rTSR. Unsteady gait per OT.  Past Medical History Relevant to Rehab: advanced OA, skin CA, B CTS, COVID, DVT, HLD, [place], HTN, ankle surgery x 4, L TKR, R TKR x 2  Family/Caregiver Present: Yes  Caregiver Feedback: spouse present  Prior to Session Communication: Bedside nurse  Patient Position Received: Up in bathroom (with nurse)  Preferred Learning Style: auditory  General Comment: Pt is pleasant and cooperative.  Home Living:  Home Living  Type of Home: House  Lives With: Spouse  Home Adaptive Equipment: Cane (B AFOs)  Home Layout: One level  Home Access: Stairs to enter without rails (but has a L grab bar to use)  Entrance Stairs-Number of Steps: 2  Prior Level of Function:  Prior Function Per Pt/Caregiver Report  Level of [place]: Independent with homemaking with ambulation  Ambulatory Assistance: Independent (with cane and B AFO)  Prior Function Comments: Pt typically uses cane in R hand.  Precautions:  Precautions  UE Weight  Bearing Status: Right Non-Weight Bearing, In Sling When Out of Bed  Medical Precautions: Fall precautions    Objective   Pain:  Pain Assessment  Pain Assessment: 0-10  Pain Score: 0 - No pain  Cognition:  Cognition  Overall Cognitive Status: Within Functional Limits    General Assessments:         Static Sitting Balance  Static Sitting-Balance Support: Feet supported, No upper extremity supported  Static Sitting-Level of Assistance: Independent    Static Standing Balance  Static Standing-Balance Support: No upper extremity supported  Static Standing-Level of Assistance: Independent  Dynamic Standing Balance  Dynamic Standing-Balance Support: No upper extremity supported  Dynamic Standing-Balance: Turning  Dynamic Standing-Comments: SBA  Functional Assessments:  Transfers  Transfer: Yes  Transfer 1  Technique 1: Sit to stand  Transfer Device 1: Cane  Transfer Level of Assistance 1: Contact guard  Trials/Comments 1: vcs for reaching back for L armrest prior to sitting    Ambulation/Gait Training  Ambulation/Gait Training Performed: Yes  Ambulation/Gait Training 1  Surface 1: Level tile  Device 1:  (hurri-cane)  Gait Support Devices: R Ankle-foot orthoses, L Ankle-foot orthosis  Assistance 1: Contact guard  Quality of Gait 1: Forward flexed posture (decreased step height)  Comments/Distance (ft) 1: 40  Extremity/Trunk Assessments:  RLE   RLE : Exceptions to WFL (WFL except ankle due to AFO)  LLE   LLE : Exceptions to WFL (WFL except ankle due to AFO)  Treatments:  Ambulation/Gait Training 2  Surface 2: Level tile  Device 2:  (hurri-cane)  Gait Support Devices: R Ankle-foot orthoses, L Ankle-foot orthosis  Assistance 2: Distant supervision  Comments/Distance (ft) 2: 20' x 2    Transfers  Transfers 2  Technique 2: Sit to stand, Stand to sit  Transfer Device 2: Cane  Transfer Level of Assistance 2: Distant supervision    Stairs  Stairs: Yes  Stairs  Rails 1: Left  Device 1: Railing  Support Devices 1: Right Ankle-foot  orthoses, Left Ankle-foot orthosis  Assistance 1: Close supervision  Comment/Number of Steps 1: 4 (ascends with reciprocal pattern, descends with non-reciprocal pattern)  Outcome Measures:  [facility] Basic Mobility  Turning from your back to your side while in a flat bed without using bedrails: None  Moving from lying on your back to sitting on the side of a flat bed without using bedrails: A little  Moving to and from bed to chair (including a wheelchair): A little  Standing up from a chair using your arms (e.g. wheelchair or bedside chair): A little  To walk in hospital room: A little  Climbing 3-5 steps with railing: A little  Basic Mobility - Total Score: 19        Education Documentation  Precautions, taught by Joslyn Louis, PT at 12/4/2023  3:28 PM.  Learner: Significant Other, Patient  Readiness: Acceptance  Method: Explanation  Response: Verbalizes Understanding, Demonstrated Understanding    Mobility Training, taught by Joslyn Louis, PT at 12/4/2023  3:28 PM.  Learner: Significant Other, Patient  Readiness: Acceptance  Method: Explanation  Response: Verbalizes Understanding, Demonstrated Understanding    Education Comments  No comments found.

## 2023-12-04 NOTE — SIGNIFICANT EVENT
FAMILY RE-UPDATED    1114  PT AWAKENED EASILY.  ENCOURAGED TO TAKE PO FLUIDS AND SNACK    1120  PT PLACED ON BEDPAN    1130  BEDPAN REMOVED.  UNABLE TO VOID.    PT SIPPING ON WATER.  ATE 1 KAYLYN CRACKER.  DECLINES LUNCH

## 2023-12-04 NOTE — ANESTHESIA PROCEDURE NOTES
Peripheral Block    Patient location during procedure: pre-op  Start time: 12/4/2023 7:15 AM  End time: 12/4/2023 7:27 AM  Reason for block: procedure for pain, at surgeon's request and post-op pain management  Staffing  Performed: attending   Authorized by: Jaciel Coyle MD    Performed by: Jaciel Coyle MD  Preanesthetic Checklist  Completed: patient identified, IV checked, site marked, risks and benefits discussed, surgical consent, monitors and equipment checked, pre-op evaluation and timeout performed   Timeout performed at: 12/4/2023 7:15 AM  Peripheral Block  Patient position: sitting  Prep: ChloraPrep  Patient monitoring: heart rate and continuous pulse ox  Block type: interscalene  Laterality: right  Injection technique: single-shot  Guidance: ultrasound guided  Local infiltration: lidocaine  Needle  Needle type: short-bevel   Needle gauge: 22 G  Needle length: 5 cm  Needle localization: ultrasound guidance     image stored in chart  Test dose: negative  Assessment  Injection assessment: negative aspiration for heme, no paresthesia on injection, incremental injection and local visualized surrounding nerve on ultrasound  Paresthesia pain: none  Heart rate change: no  Slow fractionated injection: yes  Additional Notes  Bupivacaine 0.375% 30ml + lidocaine 2% 10ml + decadron 4mg + NaHCO3 1ml used for block.  Versed 2mg iv given.  Pt annie proc well.

## 2023-12-05 ENCOUNTER — HOME CARE VISIT (OUTPATIENT)
Dept: HOME HEALTH SERVICES | Facility: HOME HEALTH | Age: 73
End: 2023-12-05

## 2023-12-05 DIAGNOSIS — Z96.611 STATUS POST REVERSE TOTAL REPLACEMENT OF RIGHT SHOULDER: Primary | ICD-10-CM

## 2023-12-05 DIAGNOSIS — N32.81 OVERACTIVE BLADDER: ICD-10-CM

## 2023-12-05 DIAGNOSIS — I10 BENIGN ESSENTIAL HYPERTENSION: ICD-10-CM

## 2023-12-05 DIAGNOSIS — S93.409A MODERATE ANKLE SPRAIN, INITIAL ENCOUNTER: ICD-10-CM

## 2023-12-05 RX ORDER — LISINOPRIL 40 MG/1
40 TABLET ORAL DAILY
Qty: 90 TABLET | Refills: 3 | Status: SHIPPED | OUTPATIENT
Start: 2023-12-05 | End: 2024-04-01 | Stop reason: SDUPTHER

## 2023-12-05 RX ORDER — OXYBUTYNIN CHLORIDE 10 MG/1
10 TABLET, EXTENDED RELEASE ORAL DAILY
Qty: 90 TABLET | Refills: 3 | Status: SHIPPED | OUTPATIENT
Start: 2023-12-05 | End: 2024-04-01 | Stop reason: SDUPTHER

## 2023-12-05 NOTE — PROGRESS NOTES
12/5/23 5855  [facility] unable to accept patient due to being OON with insurance.  Only in network options per [facility] list are [facility], [facility], and [facility].  Referrals sent.  Waiting to see if any of those agencies can accept.  Margy Valladares RN TCC    12/5/23 4907  Unable to find accepting [facility] agency.  Dr Vázquez aware and requests that patient does outpatient therapy.  Call placed to patient to update her.  She will do outpatient therapy at [facility] [place].  Fax number is [phone]. Dr Vázquez notified that prescription for therapy needs to be faxed to facility.   Margy Valladares RN TCC

## 2023-12-06 ENCOUNTER — TELEPHONE (OUTPATIENT)
Dept: PRIMARY CARE | Facility: CLINIC | Age: 73
End: 2023-12-06
Payer: COMMERCIAL

## 2023-12-06 DIAGNOSIS — M15.9 GENERALIZED OSTEOARTHRITIS: ICD-10-CM

## 2023-12-06 NOTE — TELEPHONE ENCOUNTER
Rx line @ 141    [facility] @ [facility] [phone]  Ref# [id number]    Diclofenac 1 % gel

## 2023-12-14 ENCOUNTER — TELEPHONE (OUTPATIENT)
Dept: PRIMARY CARE | Facility: CLINIC | Age: 73
End: 2023-12-14
Payer: COMMERCIAL

## 2023-12-14 DIAGNOSIS — I10 BENIGN ESSENTIAL HYPERTENSION: ICD-10-CM

## 2023-12-14 RX ORDER — AMLODIPINE BESYLATE 10 MG/1
10 TABLET ORAL DAILY
Qty: 90 TABLET | Refills: 3 | Status: SHIPPED | OUTPATIENT
Start: 2024-01-01 | End: 2024-01-03 | Stop reason: SDUPTHER

## 2023-12-14 NOTE — TELEPHONE ENCOUNTER
[facility] sent a fax requesting rx renewal amlodipine 10 mg.  I called [facility] they cancelled the prescription and will need a new rx sent to them.

## 2023-12-21 ENCOUNTER — OFFICE VISIT (OUTPATIENT)
Dept: ORTHOPEDIC SURGERY | Facility: CLINIC | Age: 73
End: 2023-12-21
Payer: COMMERCIAL

## 2023-12-21 ENCOUNTER — ANCILLARY PROCEDURE (OUTPATIENT)
Dept: RADIOLOGY | Facility: CLINIC | Age: 73
End: 2023-12-21
Payer: COMMERCIAL

## 2023-12-21 VITALS — WEIGHT: 169 LBS | BODY MASS INDEX: 34.07 KG/M2 | HEIGHT: 59 IN

## 2023-12-21 DIAGNOSIS — M25.511 RIGHT SHOULDER PAIN, UNSPECIFIED CHRONICITY: ICD-10-CM

## 2023-12-21 DIAGNOSIS — M25.511 RIGHT SHOULDER PAIN, UNSPECIFIED CHRONICITY: Primary | ICD-10-CM

## 2023-12-21 PROCEDURE — 1160F RVW MEDS BY RX/DR IN RCRD: CPT | Performed by: ORTHOPAEDIC SURGERY

## 2023-12-21 PROCEDURE — 99024 POSTOP FOLLOW-UP VISIT: CPT | Performed by: ORTHOPAEDIC SURGERY

## 2023-12-21 PROCEDURE — 1125F AMNT PAIN NOTED PAIN PRSNT: CPT | Performed by: ORTHOPAEDIC SURGERY

## 2023-12-21 PROCEDURE — 1159F MED LIST DOCD IN RCRD: CPT | Performed by: ORTHOPAEDIC SURGERY

## 2023-12-21 PROCEDURE — 73030 X-RAY EXAM OF SHOULDER: CPT | Mod: RIGHT SIDE | Performed by: RADIOLOGY

## 2023-12-21 PROCEDURE — 1036F TOBACCO NON-USER: CPT | Performed by: ORTHOPAEDIC SURGERY

## 2023-12-21 PROCEDURE — 73030 X-RAY EXAM OF SHOULDER: CPT | Mod: RT

## 2023-12-21 NOTE — PROGRESS NOTES
Follow-up reverse shoulder arthroplasty doing quite well has excellent early mobility  Incision looks fine  X-rays show prosthesis in good position assessment doing well plan physical therapy discussed expectations progression activity follow-up 2 months

## 2023-12-30 PROBLEM — H61.21 EXCESSIVE CERUMEN IN EAR CANAL, RIGHT: Status: RESOLVED | Noted: 2023-10-15 | Resolved: 2023-12-30

## 2023-12-30 PROBLEM — H91.90 HEARING LOSS: Status: RESOLVED | Noted: 2023-10-15 | Resolved: 2023-12-30

## 2023-12-30 PROBLEM — Z85.828 PERSONAL HISTORY OF OTHER MALIGNANT NEOPLASM OF SKIN: Status: RESOLVED | Noted: 2021-06-17 | Resolved: 2023-12-30

## 2023-12-30 RX ORDER — DICLOFENAC SODIUM 10 MG/G
4 GEL TOPICAL 4 TIMES DAILY PRN
COMMUNITY
Start: 2023-12-06 | End: 2024-04-05 | Stop reason: SDUPTHER

## 2024-01-03 ENCOUNTER — OFFICE VISIT (OUTPATIENT)
Dept: RHEUMATOLOGY | Facility: CLINIC | Age: 74
End: 2024-01-03
Payer: MEDICARE

## 2024-01-03 VITALS
DIASTOLIC BLOOD PRESSURE: 86 MMHG | WEIGHT: 166.1 LBS | HEIGHT: 60 IN | BODY MASS INDEX: 32.61 KG/M2 | SYSTOLIC BLOOD PRESSURE: 124 MMHG | HEART RATE: 81 BPM

## 2024-01-03 DIAGNOSIS — I10 BENIGN ESSENTIAL HYPERTENSION: ICD-10-CM

## 2024-01-03 DIAGNOSIS — M15.9 GENERALIZED OSTEOARTHRITIS: Primary | ICD-10-CM

## 2024-01-03 PROBLEM — Z98.890 PONV (POSTOPERATIVE NAUSEA AND VOMITING): Status: RESOLVED | Noted: 2023-12-04 | Resolved: 2024-01-03

## 2024-01-03 PROBLEM — R11.2 PONV (POSTOPERATIVE NAUSEA AND VOMITING): Status: RESOLVED | Noted: 2023-12-04 | Resolved: 2024-01-03

## 2024-01-03 PROCEDURE — 1159F MED LIST DOCD IN RCRD: CPT | Performed by: INTERNAL MEDICINE

## 2024-01-03 PROCEDURE — 1125F AMNT PAIN NOTED PAIN PRSNT: CPT | Performed by: INTERNAL MEDICINE

## 2024-01-03 PROCEDURE — 3079F DIAST BP 80-89 MM HG: CPT | Performed by: INTERNAL MEDICINE

## 2024-01-03 PROCEDURE — 3074F SYST BP LT 130 MM HG: CPT | Performed by: INTERNAL MEDICINE

## 2024-01-03 PROCEDURE — 99214 OFFICE O/P EST MOD 30 MIN: CPT | Performed by: INTERNAL MEDICINE

## 2024-01-03 PROCEDURE — 1160F RVW MEDS BY RX/DR IN RCRD: CPT | Performed by: INTERNAL MEDICINE

## 2024-01-03 PROCEDURE — 1036F TOBACCO NON-USER: CPT | Performed by: INTERNAL MEDICINE

## 2024-01-03 RX ORDER — METHOCARBAMOL 750 MG/1
TABLET, FILM COATED ORAL
Qty: 360 TABLET | Refills: 3 | Status: SHIPPED | OUTPATIENT
Start: 2024-01-03 | End: 2024-04-01 | Stop reason: SDUPTHER

## 2024-01-03 RX ORDER — AMLODIPINE BESYLATE 10 MG/1
10 TABLET ORAL DAILY
Qty: 90 TABLET | Refills: 3 | Status: SHIPPED | OUTPATIENT
Start: 2024-01-03 | End: 2025-01-02

## 2024-01-03 ASSESSMENT — ENCOUNTER SYMPTOMS
FEVER: 0
FATIGUE: 0
MYALGIAS: 0
WEAKNESS: 0
ARTHRALGIAS: 1
JOINT SWELLING: 0
COUGH: 0
SHORTNESS OF BREATH: 0
COLOR CHANGE: 0
STIFFNESS: 1
NUMBNESS: 0
BACK PAIN: 0

## 2024-01-03 ASSESSMENT — PAIN SCALES - GENERAL: PAINLEVEL: 3

## 2024-01-03 NOTE — ASSESSMENT & PLAN NOTE
OA on NSAID therapy.  Pt stable   Pt recovering from recent orthopedic surgery and PT will help with ROM

## 2024-01-03 NOTE — PROGRESS NOTES
Subjective   Patient ID: Kimberly Segundo is a 73 y.o. female who presents for Arthritis.  Since last seen, underwent reverse shoulder replacement on Dec 4.  Recovering    Arthritis  Presents for follow-up visit. She complains of pain and stiffness. She reports no joint swelling. The symptoms have been stable. Pertinent negatives include no fatigue, fever or rash. (Noting some finger pain but rest of joints doing ok.   Focused on getting PT for shoulder) Compliance with total regimen is %. Compliance with medications is %.     Patient Active Problem List    Diagnosis Date Noted    Arthritis, lumbar spine 10/15/2023    Benign essential hypertension 10/15/2023    Calcific tendinitis of shoulder 10/15/2023    Ganglion cyst of dorsum of left wrist 10/15/2023    Generalized osteoarthritis 10/15/2023    Mixed hyperlipidemia 10/15/2023    Osteopenia 10/15/2023    Overactive bladder 10/15/2023    Positive FIT (fecal immunochemical test) 10/15/2023    Sensorineural hearing loss, bilateral 10/15/2023    Venous stasis dermatitis of left lower extremity 10/15/2023    Wears hearing aid 10/15/2023    Actinic keratosis 06/17/2021    Other seborrheic keratosis 06/17/2021    Hemangioma of skin and subcutaneous tissue 06/17/2021    Melanocytic nevi of trunk 06/17/2021    Neoplasm of uncertain behavior of skin 06/17/2021    Other melanin hyperpigmentation 06/17/2021    Scar condition and fibrosis of skin 06/17/2021     Past Medical History:   Diagnosis Date    Basal cell carcinoma of skin of other parts of face 06/17/2021    Carpal tunnel syndrome, bilateral upper limbs 10/10/2018    Bilateral carpal tunnel syndrome    COVID-19 12/20/2021    COVID-19 virus infection    DVT (deep venous thrombosis) (CMS/[facility])     s/p knee surgery, was briefly on Lovenox, no further issues,    HLD (hyperlipidemia)     managed on meds    [place] (hard of hearing)     bilateral hearing aids    HTN (hypertension)     managed on meds    OA  (osteoarthritis)     OAB (overactive bladder)     managed on meds    Osteopenia     Personal history of other malignant neoplasm of skin     Personal history of malignant neoplasm of skin     Allergies   Allergen Reactions    Ciprofloxacin Unknown     It was so long ago I don't remember    Tramadol Nausea Only and Nausea/vomiting     Current Outpatient Medications   Medication Instructions    amLODIPine (NORVASC) 10 mg, oral, Daily    atorvastatin (LIPITOR) 40 mg, oral, Nightly    calcium carbonate/vitamin D3 (CALCIUM 600 + D,3, ORAL) 1 capsule, oral, 2 times daily    cholecalciferol (VITAMIN D3) 50 mcg, oral, Daily    diclofenac (VOLTAREN) 75 mg, oral, 2 times daily PRN, Do not crush, chew, or split.    diclofenac sodium (VOLTAREN) 4 g, Topical, 4 times daily PRN    lisinopril 40 mg, oral, Daily, as directed    methocarbamol (Robaxin) 750 mg tablet TAKE 1 TABLET 4 TIMES DAILY AS NEEDED.    oxybutynin XL (DITROPAN-XL) 10 mg, oral, Daily       Review of Systems   Constitutional:  Negative for fatigue and fever.   Respiratory:  Negative for cough and shortness of breath.    Cardiovascular:  Negative for leg swelling.   Musculoskeletal:  Positive for arthralgias, arthritis, gait problem and stiffness. Negative for back pain, joint swelling and myalgias.   Skin:  Negative for color change and rash.   Neurological:  Negative for weakness and numbness.       Objective  /86   Pulse 81   Ht 1.524 m (5')   Wt 75.3 kg (166 lb 1.6 oz)   BMI 32.44 kg/m²     Physical Exam  Vitals reviewed.   Constitutional:       General: She is not in acute distress.     Appearance: Normal appearance.   HENT:      Head: Normocephalic and atraumatic.   Eyes:      Conjunctiva/sclera: Conjunctivae normal.   Pulmonary:      Effort: Pulmonary effort is normal. No respiratory distress.   Musculoskeletal:         General: Swelling present. No tenderness or deformity.      Cervical back: Normal range of motion.      Right lower leg: No edema.       Left lower leg: No edema.      Comments: Early OA in hands.  OA in CMC joint.  R shoulder s/p surgery.  Pt working on passive ROM in PT  Brace on legs.  --degenerative changes of feet and ankles  Using cane to ambulate   Skin:     Coloration: Skin is not pale.      Findings: No erythema or rash.   Neurological:      General: No focal deficit present.      Mental Status: She is alert and oriented to person, place, and time. Mental status is at baseline.      Gait: Gait abnormal.   Psychiatric:         Mood and Affect: Mood normal.         Assessment/Plan   Problem List Items Addressed This Visit             ICD-10-CM    Benign essential hypertension I10     Pt with new insurance.  Refill done         Relevant Medications    amLODIPine (Norvasc) 10 mg tablet    Generalized osteoarthritis - Primary M15.9     OA on NSAID therapy.  Pt stable   Pt recovering from recent orthopedic surgery and PT will help with ROM          Relevant Medications    methocarbamol (Robaxin) 750 mg tablet     Follow up 6 mo       Helen Arshad MD 01/03/24 8:43 AM

## 2024-01-03 NOTE — PATIENT INSTRUCTIONS
It was a pleasure to see you today  Please call if your symptoms worsen  Please review your summary for education and reminders.  Follow up at your next appointment.    If you had labs/xrays done today, you will be able to view on [employer].   We will contact you when the results are reviewed for further discussion.  Please note that you may receive your results before I have had a chance to review.  Please know I will be contacting you for discussion  Homegoing instructions for all patient  A healthy lifestyle helps chronic diseases  These are all the goals you should strive to improve your overall health   Blood pressure <130/85   BMI of <30 or waist circumference that is 1/2 of your height   Fasting blood sugar <107 (if you are diabetic, aim for an A1c <6.4%_   LDL cholesterol <130   Avoid smoking   Manage your stress   Get your preventive exams   Get your immunizations

## 2024-01-03 NOTE — LETTER
January 3, 2024     Viviana Moore MD  [address] [facility] [address]    Patient: Kimberly Segundo   YOB: 1950   Date of Visit: 1/3/2024       Dear Dr. Viviana Moore MD:    Thank you for referring Kimberly Segundo to me for evaluation. Below are my notes for this consultation.  If you have questions, please do not hesitate to call me. I look forward to following your patient along with you.       Sincerely,     Helen Arshad MD      CC: No Recipients  ______________________________________________________________________________________    Subjective  Patient ID: Kimberly Segundo is a 73 y.o. female who presents for Arthritis.  Since last seen, underwent reverse shoulder replacement on Dec 4.  Recovering    Arthritis  Presents for follow-up visit. She complains of pain and stiffness. She reports no joint swelling. The symptoms have been stable. Pertinent negatives include no fatigue, fever or rash. (Noting some finger pain but rest of joints doing ok.   Focused on getting PT for shoulder) Compliance with total regimen is %. Compliance with medications is %.     Patient Active Problem List    Diagnosis Date Noted   • Arthritis, lumbar spine 10/15/2023   • Benign essential hypertension 10/15/2023   • Calcific tendinitis of shoulder 10/15/2023   • Ganglion cyst of dorsum of left wrist 10/15/2023   • Generalized osteoarthritis 10/15/2023   • Mixed hyperlipidemia 10/15/2023   • Osteopenia 10/15/2023   • Overactive bladder 10/15/2023   • Positive FIT (fecal immunochemical test) 10/15/2023   • Sensorineural hearing loss, bilateral 10/15/2023   • Venous stasis dermatitis of left lower extremity 10/15/2023   • Wears hearing aid 10/15/2023   • Actinic keratosis 06/17/2021   • Other seborrheic keratosis 06/17/2021   • Hemangioma of skin and subcutaneous tissue 06/17/2021   • Melanocytic nevi of trunk 06/17/2021   • Neoplasm of uncertain behavior of skin 06/17/2021   • Other melanin  hyperpigmentation 06/17/2021   • Scar condition and fibrosis of skin 06/17/2021     Past Medical History:   Diagnosis Date   • Basal cell carcinoma of skin of other parts of face 06/17/2021   • Carpal tunnel syndrome, bilateral upper limbs 10/10/2018    Bilateral carpal tunnel syndrome   • COVID-19 12/20/2021    COVID-19 virus infection   • DVT (deep venous thrombosis) (CMS/HCC)     s/p knee surgery, was briefly on Lovenox, no further issues,   • HLD (hyperlipidemia)     managed on meds   • [place] (hard of hearing)     bilateral hearing aids   • HTN (hypertension)     managed on meds   • OA (osteoarthritis)    • OAB (overactive bladder)     managed on meds   • Osteopenia    • Personal history of other malignant neoplasm of skin     Personal history of malignant neoplasm of skin     Allergies   Allergen Reactions   • Ciprofloxacin Unknown     It was so long ago I don't remember   • Tramadol Nausea Only and Nausea/vomiting     Current Outpatient Medications   Medication Instructions   • amLODIPine (NORVASC) 10 mg, oral, Daily   • atorvastatin (LIPITOR) 40 mg, oral, Nightly   • calcium carbonate/vitamin D3 (CALCIUM 600 + D,3, ORAL) 1 capsule, oral, 2 times daily   • cholecalciferol (VITAMIN D3) 50 mcg, oral, Daily   • diclofenac (VOLTAREN) 75 mg, oral, 2 times daily PRN, Do not crush, chew, or split.   • diclofenac sodium (VOLTAREN) 4 g, Topical, 4 times daily PRN   • lisinopril 40 mg, oral, Daily, as directed   • methocarbamol (Robaxin) 750 mg tablet TAKE 1 TABLET 4 TIMES DAILY AS NEEDED.   • oxybutynin XL (DITROPAN-XL) 10 mg, oral, Daily       Review of Systems   Constitutional:  Negative for fatigue and fever.   Respiratory:  Negative for cough and shortness of breath.    Cardiovascular:  Negative for leg swelling.   Musculoskeletal:  Positive for arthralgias, arthritis, gait problem and stiffness. Negative for back pain, joint swelling and myalgias.   Skin:  Negative for color change and rash.   Neurological:   Negative for weakness and numbness.       Objective /86   Pulse 81   Ht 1.524 m (5')   Wt 75.3 kg (166 lb 1.6 oz)   BMI 32.44 kg/m²     Physical Exam  Vitals reviewed.   Constitutional:       General: She is not in acute distress.     Appearance: Normal appearance.   HENT:      Head: Normocephalic and atraumatic.   Eyes:      Conjunctiva/sclera: Conjunctivae normal.   Pulmonary:      Effort: Pulmonary effort is normal. No respiratory distress.   Musculoskeletal:         General: Swelling present. No tenderness or deformity.      Cervical back: Normal range of motion.      Right lower leg: No edema.      Left lower leg: No edema.      Comments: Early OA in hands.  OA in CMC joint.  R shoulder s/p surgery.  Pt working on passive ROM in PT  Brace on legs.  --degenerative changes of feet and ankles  Using cane to ambulate   Skin:     Coloration: Skin is not pale.      Findings: No erythema or rash.   Neurological:      General: No focal deficit present.      Mental Status: She is alert and oriented to person, place, and time. Mental status is at baseline.      Gait: Gait abnormal.   Psychiatric:         Mood and Affect: Mood normal.         Assessment/Plan  Problem List Items Addressed This Visit             ICD-10-CM    Benign essential hypertension I10     Pt with new insurance.  Refill done         Relevant Medications    amLODIPine (Norvasc) 10 mg tablet    Generalized osteoarthritis - Primary M15.9     OA on NSAID therapy.  Pt stable   Pt recovering from recent orthopedic surgery and PT will help with ROM          Relevant Medications    methocarbamol (Robaxin) 750 mg tablet     Follow up 6 mo       Helen Arshad MD 01/03/24 8:43 AM

## 2024-02-05 ENCOUNTER — TELEPHONE (OUTPATIENT)
Dept: RHEUMATOLOGY | Facility: CLINIC | Age: 74
End: 2024-02-05
Payer: MEDICARE

## 2024-02-05 DIAGNOSIS — M15.9 GENERALIZED OSTEOARTHRITIS: ICD-10-CM

## 2024-02-05 RX ORDER — DICLOFENAC SODIUM 75 MG/1
75 TABLET, DELAYED RELEASE ORAL 2 TIMES DAILY PRN
Qty: 180 TABLET | Refills: 3 | Status: SHIPPED | OUTPATIENT
Start: 2024-02-05 | End: 2024-04-01 | Stop reason: SDUPTHER

## 2024-02-20 ENCOUNTER — OFFICE VISIT (OUTPATIENT)
Dept: ORTHOPEDIC SURGERY | Facility: CLINIC | Age: 74
End: 2024-02-20
Payer: MEDICARE

## 2024-02-20 VITALS — HEIGHT: 60 IN | BODY MASS INDEX: 31.41 KG/M2 | WEIGHT: 160 LBS

## 2024-02-20 DIAGNOSIS — M25.511 RIGHT SHOULDER PAIN, UNSPECIFIED CHRONICITY: Primary | ICD-10-CM

## 2024-02-20 PROCEDURE — 1160F RVW MEDS BY RX/DR IN RCRD: CPT | Performed by: ORTHOPAEDIC SURGERY

## 2024-02-20 PROCEDURE — 1125F AMNT PAIN NOTED PAIN PRSNT: CPT | Performed by: ORTHOPAEDIC SURGERY

## 2024-02-20 PROCEDURE — 1159F MED LIST DOCD IN RCRD: CPT | Performed by: ORTHOPAEDIC SURGERY

## 2024-02-20 PROCEDURE — 99024 POSTOP FOLLOW-UP VISIT: CPT | Performed by: ORTHOPAEDIC SURGERY

## 2024-02-20 PROCEDURE — 1036F TOBACCO NON-USER: CPT | Performed by: ORTHOPAEDIC SURGERY

## 2024-02-20 NOTE — PROGRESS NOTES
11 weeks status post right total shoulder arthroplasty reverse  Doing well she still in therapy some minor discomfort around the incision but much better functionally and pain wise than preop  On examination she lacks about 40 degrees of elevation external rotation is excellent seems to have good strength impression doing well advised to continue therapy as long as she finds it helpful discussed expectations follow-up annually

## 2024-03-19 ENCOUNTER — OFFICE VISIT (OUTPATIENT)
Dept: PRIMARY CARE | Facility: CLINIC | Age: 74
End: 2024-03-19
Payer: MEDICARE

## 2024-03-19 VITALS
HEIGHT: 58 IN | BODY MASS INDEX: 35.08 KG/M2 | DIASTOLIC BLOOD PRESSURE: 74 MMHG | SYSTOLIC BLOOD PRESSURE: 136 MMHG | OXYGEN SATURATION: 97 % | WEIGHT: 167.1 LBS | HEART RATE: 72 BPM | TEMPERATURE: 97.4 F

## 2024-03-19 DIAGNOSIS — Z00.00 MEDICARE ANNUAL WELLNESS VISIT, SUBSEQUENT: Primary | ICD-10-CM

## 2024-03-19 DIAGNOSIS — E66.09 CLASS 1 OBESITY DUE TO EXCESS CALORIES WITH SERIOUS COMORBIDITY AND BODY MASS INDEX (BMI) OF 34.0 TO 34.9 IN ADULT: ICD-10-CM

## 2024-03-19 DIAGNOSIS — Z71.89 CARDIAC RISK COUNSELING: ICD-10-CM

## 2024-03-19 DIAGNOSIS — N32.81 OVERACTIVE BLADDER: ICD-10-CM

## 2024-03-19 DIAGNOSIS — I10 BENIGN ESSENTIAL HYPERTENSION: ICD-10-CM

## 2024-03-19 DIAGNOSIS — E78.2 MIXED HYPERLIPIDEMIA: ICD-10-CM

## 2024-03-19 DIAGNOSIS — Z12.31 VISIT FOR SCREENING MAMMOGRAM: ICD-10-CM

## 2024-03-19 DIAGNOSIS — Z13.89 SCREENING FOR MULTIPLE CONDITIONS: ICD-10-CM

## 2024-03-19 PROBLEM — E66.811 CLASS 1 OBESITY DUE TO EXCESS CALORIES WITH SERIOUS COMORBIDITY AND BODY MASS INDEX (BMI) OF 34.0 TO 34.9 IN ADULT: Status: ACTIVE | Noted: 2024-03-19

## 2024-03-19 PROBLEM — Z96.611 HISTORY OF RIGHT SHOULDER REPLACEMENT: Status: ACTIVE | Noted: 2024-03-19

## 2024-03-19 PROCEDURE — G0447 BEHAVIOR COUNSEL OBESITY 15M: HCPCS | Performed by: INTERNAL MEDICINE

## 2024-03-19 PROCEDURE — 3078F DIAST BP <80 MM HG: CPT | Performed by: INTERNAL MEDICINE

## 2024-03-19 PROCEDURE — 1160F RVW MEDS BY RX/DR IN RCRD: CPT | Performed by: INTERNAL MEDICINE

## 2024-03-19 PROCEDURE — 1123F ACP DISCUSS/DSCN MKR DOCD: CPT | Performed by: INTERNAL MEDICINE

## 2024-03-19 PROCEDURE — 1158F ADVNC CARE PLAN TLK DOCD: CPT | Performed by: INTERNAL MEDICINE

## 2024-03-19 PROCEDURE — G0439 PPPS, SUBSEQ VISIT: HCPCS | Performed by: INTERNAL MEDICINE

## 2024-03-19 PROCEDURE — 3008F BODY MASS INDEX DOCD: CPT | Performed by: INTERNAL MEDICINE

## 2024-03-19 PROCEDURE — 1170F FXNL STATUS ASSESSED: CPT | Performed by: INTERNAL MEDICINE

## 2024-03-19 PROCEDURE — 99214 OFFICE O/P EST MOD 30 MIN: CPT | Performed by: INTERNAL MEDICINE

## 2024-03-19 PROCEDURE — 1159F MED LIST DOCD IN RCRD: CPT | Performed by: INTERNAL MEDICINE

## 2024-03-19 PROCEDURE — 3075F SYST BP GE 130 - 139MM HG: CPT | Performed by: INTERNAL MEDICINE

## 2024-03-19 PROCEDURE — 1036F TOBACCO NON-USER: CPT | Performed by: INTERNAL MEDICINE

## 2024-03-19 ASSESSMENT — ENCOUNTER SYMPTOMS
CONFUSION: 0
CHILLS: 0
LIGHT-HEADEDNESS: 0
FATIGUE: 0
COUGH: 0
SORE THROAT: 0
NAUSEA: 0
HEADACHES: 0
HEMATURIA: 0
ARTHRALGIAS: 1
PALPITATIONS: 0
FREQUENCY: 1
SHORTNESS OF BREATH: 0
CONSTIPATION: 0
DIZZINESS: 0
FEVER: 0
VOMITING: 0
ABDOMINAL PAIN: 0
DIARRHEA: 0
DYSURIA: 0

## 2024-03-19 ASSESSMENT — PATIENT HEALTH QUESTIONNAIRE - PHQ9
2. FEELING DOWN, DEPRESSED OR HOPELESS: NOT AT ALL
SUM OF ALL RESPONSES TO PHQ9 QUESTIONS 1 AND 2: 0
1. LITTLE INTEREST OR PLEASURE IN DOING THINGS: NOT AT ALL

## 2024-03-19 ASSESSMENT — ACTIVITIES OF DAILY LIVING (ADL)
DOING_HOUSEWORK: INDEPENDENT
TAKING_MEDICATION: INDEPENDENT
BATHING: INDEPENDENT
DRESSING: INDEPENDENT
MANAGING_FINANCES: INDEPENDENT
GROCERY_SHOPPING: INDEPENDENT

## 2024-03-19 NOTE — ASSESSMENT & PLAN NOTE
BMI was above normal measurement. Current weight: 75.8 kg (167 lb 1.6 oz)  Weight change since last visit (-) denotes wt loss 7.1 lbs   Weight loss needed to achieve BMI 25: 47.7 Lbs  Weight loss needed to achieve BMI 30: 23.9 Lbs  Provided instructions on dietary changes.

## 2024-03-19 NOTE — PROGRESS NOTES
CHIEF COMPLAINT  Medicare Annual Wellness Visit Subsequent    HISTORY OF PRESENT ILLNESS  Kimberly Segundo is a 73 y.o. female presents today for follow up of Medicare Annual Wellness Visit Subsequent    HPI    Past Medical, Surgical, and Family History reviewed and updated in chart.  Reviewed all medications by prescribing practitioner or clinical pharmacist (such as prescriptions, OTCs, herbal therapies and supplements) and documented in the medical record.    Right shoulder replacement in December.  Everything went well - she is completing her PT.  She has improved pain and improved ROM.  Due for fasting labs.  She needs referral again for urogynecologist - has had surgery before and Interstim which does no seem to be working for her anymore.      REVIEW OF SYSTEMS  Review of Systems   Constitutional:  Negative for chills, fatigue and fever.   HENT:  Negative for sore throat.    Respiratory:  Negative for cough and shortness of breath.    Cardiovascular:  Negative for chest pain, palpitations and leg swelling.   Gastrointestinal:  Negative for abdominal pain, constipation, diarrhea, nausea and vomiting.   Genitourinary:  Positive for frequency and urgency. Negative for dysuria and hematuria.   Musculoskeletal:  Positive for arthralgias and gait problem.   Skin:  Negative for rash.   Neurological:  Negative for dizziness, light-headedness and headaches.   Psychiatric/Behavioral:  Negative for confusion.        ALLERGIES  Ciprofloxacin and Tramadol    MEDICATIONS  Current Outpatient Medications   Medication Instructions    amLODIPine (NORVASC) 10 mg, oral, Daily    atorvastatin (LIPITOR) 40 mg, oral, Nightly    calcium carbonate/vitamin D3 (CALCIUM 600 + D,3, ORAL) 1 capsule, oral, 2 times daily    cholecalciferol (VITAMIN D3) 50 mcg, oral, Daily    diclofenac (VOLTAREN) 75 mg, oral, 2 times daily PRN, Do not crush, chew, or split.    diclofenac sodium (VOLTAREN) 4 g, Topical, 4 times daily PRN    lisinopril 40  "mg, oral, Daily, as directed    methocarbamol (Robaxin) 750 mg tablet TAKE 1 TABLET 4 TIMES DAILY AS NEEDED.    oxybutynin XL (DITROPAN-XL) 10 mg, oral, Daily       TOBACCO USE  Social History     Tobacco Use   Smoking Status Never   Smokeless Tobacco Never       DEPRESSION SCREEN  Over the past 2 weeks, how often have you been bothered by any of the following problems?  Little interest or pleasure in doing things: Not at all  Feeling down, depressed, or hopeless: Not at all    SURGICAL HISTORY  Past Surgical History:  10/10/2018: ANKLE SURGERY      Comment:  Ankle Surgeryx4 per patient she has 3 metal plates and 4               screws  10/10/2018: BLADDER SURGERY      Comment:  Bladder Surgery, interstem placement  04/09/2019: CARPAL TUNNEL RELEASE      Comment:  Carpal tunnel surgery  12/28/2018: CARPAL TUNNEL RELEASE      Comment:  Carpal tunnel surgery  10/10/2018: HYSTERECTOMY      Comment:  Hysterectomy  10/10/2018: HYSTEROSCOPY      Comment:  Hysteroscopy With Endometrial Ablation  No date: HYSTEROSCOPY      Comment:  with endometrial ablation  12/04/2023: REVERSE TOTAL SHOULDER ARTHROPLASTY; Right  07/27/2022: TONSILLECTOMY      Comment:  Tonsillectomy with adenoidectomy  10/10/2018: TOTAL KNEE ARTHROPLASTY      Comment:  Total Knee Replacement Left  10/10/2018: TOTAL KNEE ARTHROPLASTY      Comment:  Total Knee Replacement Rightx2       OBJECTIVE    /74   Pulse 72   Temp 36.3 °C (97.4 °F)   Ht 1.473 m (4' 10\")   Wt 75.8 kg (167 lb 1.6 oz)   SpO2 97%   BMI 34.92 kg/m²    BMI: Estimated body mass index is 34.92 kg/m² as calculated from the following:    Height as of this encounter: 1.473 m (4' 10\").    Weight as of this encounter: 75.8 kg (167 lb 1.6 oz).    BP Readings from Last 3 Encounters:   03/19/24 136/74   01/03/24 124/86   12/04/23 132/78      Wt Readings from Last 3 Encounters:   03/19/24 75.8 kg (167 lb 1.6 oz)   02/20/24 72.6 kg (160 lb)   01/03/24 75.3 kg (166 lb 1.6 oz)      The " 10-year ASCVD risk score (Vibha HOPKINS, et al., 2019) is: 18.9%    Values used to calculate the score:      Age: 73 years      Sex: Female      Is Non- : No      Diabetic: No      Tobacco smoker: No      Systolic Blood Pressure: 136 mmHg      Is BP treated: Yes      HDL Cholesterol: 68.2 mg/dL      Total Cholesterol: 198 mg/dL    PHYSICAL EXAM  Physical Exam  Constitutional:       Appearance: Normal appearance.   HENT:      Head: Normocephalic and atraumatic.   Cardiovascular:      Rate and Rhythm: Normal rate and regular rhythm.      Pulses: Normal pulses.      Heart sounds: No murmur heard.  Pulmonary:      Effort: Pulmonary effort is normal. No respiratory distress.      Breath sounds: Normal breath sounds. No wheezing.   Abdominal:      General: There is no distension.      Palpations: Abdomen is soft.      Tenderness: There is no abdominal tenderness.   Skin:     Findings: No rash.   Neurological:      General: No focal deficit present.      Mental Status: She is alert and oriented to person, place, and time. Mental status is at baseline.      Gait: Gait abnormal.      Comments: AFO braces bilaterally   Psychiatric:         Mood and Affect: Mood normal.         Behavior: Behavior normal.         Thought Content: Thought content normal.         Judgment: Judgment normal.          ASSESSMENT AND PLAN  Assessment/Plan   Problem List Items Addressed This Visit       Benign essential hypertension    Relevant Orders    Lipid Panel    CBC and Auto Differential    Comprehensive Metabolic Panel    Mixed hyperlipidemia    Relevant Orders    Lipid Panel    CBC and Auto Differential    Comprehensive Metabolic Panel    Overactive bladder    Relevant Orders    Referral to Urogynecology    Medicare annual wellness visit, subsequent - Primary    Screening for multiple conditions    Overview     Depression screening completed using the PHQ2 questions with results documented in the chart/encounter            Cardiac risk counseling    Overview     3/19/2024 ASCVD risk 18.9%, intermediate  -currently on statin  -Counseled regarding CT Cardiac Score, patient declines at this time  -Heart healthy diet recommended (Mediterranean Style)         Class 1 obesity due to excess calories with serious comorbidity and body mass index (BMI) of 34.0 to 34.9 in adult    Overview     Aim for 30 minutes of aerobic exercise, 5 times per week.  Try to cut back on processed foods, including foods made with flour, sugar, added salt, and saturated fat.           Current Assessment & Plan     BMI was above normal measurement. Current weight: 75.8 kg (167 lb 1.6 oz)  Weight change since last visit (-) denotes wt loss 7.1 lbs   Weight loss needed to achieve BMI 25: 47.7 Lbs  Weight loss needed to achieve BMI 30: 23.9 Lbs  Provided instructions on dietary changes.          Other Visit Diagnoses       Visit for screening mammogram        Relevant Orders    BI mammo bilateral screening tomosynthesis            Medicare Wellness Visit completed.     Hypertension - at goal. She checks at home and it is usually < 140/90. Continue current medication, check CMP.     Mixed hyperlipidemia - on statin, check lipid panel.     Overactive bladder - s/p remote Interstim, likely no longer effective / no battery remaining. Referral to Urogyn.     Follows with Dr. Arshad for osteoarthritis.     Mammogram ordered.     Pap not indicated (s/p hyst).     Colon Cancer Screening - FIT positive March 2022, had follow-up colonoscopy which was reportedly normal, will request report from [facility].     Reviewed signs of skin cancer and importance of sun protection.      Continue to stay current with routine dental and eye exams.    Flu and COVID vaccines recommended annually.  RSV vaccine recommended at pharmacy.    Follow-up in 6 months.

## 2024-04-01 ENCOUNTER — TELEPHONE (OUTPATIENT)
Dept: RHEUMATOLOGY | Facility: CLINIC | Age: 74
End: 2024-04-01
Payer: MEDICARE

## 2024-04-01 ENCOUNTER — TELEPHONE (OUTPATIENT)
Dept: PRIMARY CARE | Facility: CLINIC | Age: 74
End: 2024-04-01
Payer: MEDICARE

## 2024-04-01 DIAGNOSIS — N32.81 OVERACTIVE BLADDER: ICD-10-CM

## 2024-04-01 DIAGNOSIS — I10 BENIGN ESSENTIAL HYPERTENSION: ICD-10-CM

## 2024-04-01 DIAGNOSIS — M15.9 GENERALIZED OSTEOARTHRITIS: ICD-10-CM

## 2024-04-01 DIAGNOSIS — E78.2 MIXED HYPERLIPIDEMIA: ICD-10-CM

## 2024-04-01 RX ORDER — DICLOFENAC SODIUM 75 MG/1
75 TABLET, DELAYED RELEASE ORAL 2 TIMES DAILY PRN
Qty: 180 TABLET | Refills: 3 | Status: SHIPPED | OUTPATIENT
Start: 2024-04-01 | End: 2024-04-05 | Stop reason: SDUPTHER

## 2024-04-01 RX ORDER — OXYBUTYNIN CHLORIDE 10 MG/1
10 TABLET, EXTENDED RELEASE ORAL DAILY
Qty: 90 TABLET | Refills: 3 | Status: SHIPPED | OUTPATIENT
Start: 2024-04-01 | End: 2024-04-05 | Stop reason: SDUPTHER

## 2024-04-01 RX ORDER — LISINOPRIL 40 MG/1
40 TABLET ORAL DAILY
Qty: 90 TABLET | Refills: 3 | Status: SHIPPED | OUTPATIENT
Start: 2024-04-01 | End: 2024-04-05 | Stop reason: SDUPTHER

## 2024-04-01 RX ORDER — ATORVASTATIN CALCIUM 40 MG/1
40 TABLET, FILM COATED ORAL NIGHTLY
Qty: 90 TABLET | Refills: 3 | Status: SHIPPED | OUTPATIENT
Start: 2024-04-01 | End: 2024-04-05 | Stop reason: SDUPTHER

## 2024-04-01 RX ORDER — METHOCARBAMOL 750 MG/1
750 TABLET, FILM COATED ORAL 3 TIMES DAILY
Qty: 270 TABLET | Refills: 3 | Status: SHIPPED | OUTPATIENT
Start: 2024-04-01 | End: 2024-04-10 | Stop reason: SDUPTHER

## 2024-04-01 NOTE — TELEPHONE ENCOUNTER
Refill  Lisinopril 40mg            Oxybutynin xl 10mg             Atorvastatin 40mg     RX DM [facility] [place] [phone]

## 2024-04-01 NOTE — TELEPHONE ENCOUNTER
Refill  methocarbamol 750mg             Diclofenac sodium (voltaren) 1% gel     RX DM [place] [phone]

## 2024-04-05 ENCOUNTER — TELEPHONE (OUTPATIENT)
Dept: PRIMARY CARE | Facility: CLINIC | Age: 74
End: 2024-04-05
Payer: MEDICARE

## 2024-04-05 ENCOUNTER — TELEPHONE (OUTPATIENT)
Dept: RHEUMATOLOGY | Facility: CLINIC | Age: 74
End: 2024-04-05
Payer: MEDICARE

## 2024-04-05 RX ORDER — LISINOPRIL 40 MG/1
40 TABLET ORAL DAILY
Qty: 90 TABLET | Refills: 3 | Status: SHIPPED | OUTPATIENT
Start: 2024-04-05 | End: 2024-04-10 | Stop reason: SDUPTHER

## 2024-04-05 RX ORDER — ATORVASTATIN CALCIUM 40 MG/1
40 TABLET, FILM COATED ORAL NIGHTLY
Qty: 90 TABLET | Refills: 3 | Status: SHIPPED | OUTPATIENT
Start: 2024-04-05 | End: 2024-04-10 | Stop reason: SDUPTHER

## 2024-04-05 RX ORDER — DICLOFENAC SODIUM 75 MG/1
75 TABLET, DELAYED RELEASE ORAL 2 TIMES DAILY PRN
Qty: 180 TABLET | Refills: 3 | Status: SHIPPED | OUTPATIENT
Start: 2024-04-05 | End: 2025-04-05

## 2024-04-05 RX ORDER — OXYBUTYNIN CHLORIDE 10 MG/1
10 TABLET, EXTENDED RELEASE ORAL DAILY
Qty: 90 TABLET | Refills: 3 | Status: SHIPPED | OUTPATIENT
Start: 2024-04-05 | End: 2024-04-10 | Stop reason: SDUPTHER

## 2024-04-05 RX ORDER — DICLOFENAC SODIUM 10 MG/G
4 GEL TOPICAL 4 TIMES DAILY PRN
Qty: 450 G | Refills: 3 | Status: SHIPPED | OUTPATIENT
Start: 2024-04-05 | End: 2024-04-05 | Stop reason: SDUPTHER

## 2024-04-05 RX ORDER — DICLOFENAC SODIUM 75 MG/1
75 TABLET, DELAYED RELEASE ORAL 2 TIMES DAILY PRN
Qty: 180 TABLET | Refills: 3 | Status: SHIPPED | OUTPATIENT
Start: 2024-04-05 | End: 2024-04-05 | Stop reason: SDUPTHER

## 2024-04-05 RX ORDER — DICLOFENAC SODIUM 10 MG/G
4 GEL TOPICAL 4 TIMES DAILY PRN
Qty: 450 G | Refills: 3 | Status: SHIPPED | OUTPATIENT
Start: 2024-04-05 | End: 2025-04-05

## 2024-04-05 NOTE — TELEPHONE ENCOUNTER
Patient called stating her prescriptions were sent to her local pharmacy and should have gone to [facility].  Patient already contacted [facility].  Patient asking for rx refills-  Diclofenac 75 mg ER Tab 2 tablets daily  Diclofenac sodium 1 % Gel 5 tubes per month  [facility]  Patient phone [phone].

## 2024-04-05 NOTE — TELEPHONE ENCOUNTER
Patient called stating her prescriptions were sent to her local pharmacy and [facility].  Patient already contacted [facility].  Patient asking for rx refills-  Atorvastatin 40 mg  Lisinopril 40 mg  Oxybutynin XL 10 mg 24 hr  [facility]  Patient phone [phone].

## 2024-04-10 ENCOUNTER — TELEPHONE (OUTPATIENT)
Dept: RHEUMATOLOGY | Facility: CLINIC | Age: 74
End: 2024-04-10
Payer: MEDICARE

## 2024-04-10 ENCOUNTER — TELEPHONE (OUTPATIENT)
Dept: PRIMARY CARE | Facility: CLINIC | Age: 74
End: 2024-04-10
Payer: MEDICARE

## 2024-04-10 DIAGNOSIS — I10 BENIGN ESSENTIAL HYPERTENSION: ICD-10-CM

## 2024-04-10 DIAGNOSIS — E78.2 MIXED HYPERLIPIDEMIA: ICD-10-CM

## 2024-04-10 DIAGNOSIS — N32.81 OVERACTIVE BLADDER: ICD-10-CM

## 2024-04-10 DIAGNOSIS — M15.9 GENERALIZED OSTEOARTHRITIS: ICD-10-CM

## 2024-04-10 RX ORDER — OXYBUTYNIN CHLORIDE 10 MG/1
10 TABLET, EXTENDED RELEASE ORAL DAILY
Qty: 90 TABLET | Refills: 3 | Status: SHIPPED | OUTPATIENT
Start: 2024-04-10

## 2024-04-10 RX ORDER — LISINOPRIL 40 MG/1
40 TABLET ORAL DAILY
Qty: 90 TABLET | Refills: 3 | Status: SHIPPED | OUTPATIENT
Start: 2024-04-10

## 2024-04-10 RX ORDER — ATORVASTATIN CALCIUM 40 MG/1
40 TABLET, FILM COATED ORAL NIGHTLY
Qty: 90 TABLET | Refills: 3 | Status: SHIPPED | OUTPATIENT
Start: 2024-04-10 | End: 2025-04-10

## 2024-04-10 RX ORDER — METHOCARBAMOL 750 MG/1
750 TABLET, FILM COATED ORAL 3 TIMES DAILY
Qty: 270 TABLET | Refills: 3 | Status: SHIPPED | OUTPATIENT
Start: 2024-04-10 | End: 2024-04-11

## 2024-04-10 NOTE — TELEPHONE ENCOUNTER
Patient called stating she had requested previously rx refill-Methocarbamol 750 mg with new directions take 1 pil twice daily.  Patient cancelled her prescriptions with [facility].  Per my call to [facility] patient did called them and cancel some of her prescriptions.  -[facility] [phone].  Patient phone [phone].

## 2024-04-10 NOTE — TELEPHONE ENCOUNTER
Patient called stating [facility] did not receive prescriptions from Dr. Moore.  I called [facility] and [facility] and was told by both pharmacies the patient called them and cancelled her prescriptions.  Patient requesting rx refills-  Atorvastatin 40 mg  Lisinopril 40 mg  Oxybutynin XL 10 mg  -[facility] [phone].  Patient phone [phone].

## 2024-04-11 RX ORDER — METHOCARBAMOL 750 MG/1
750 TABLET, FILM COATED ORAL 3 TIMES DAILY
Qty: 270 TABLET | Refills: 3 | Status: SHIPPED | OUTPATIENT
Start: 2024-04-11 | End: 2025-04-11

## 2024-05-16 ENCOUNTER — HOSPITAL ENCOUNTER (OUTPATIENT)
Dept: RADIOLOGY | Facility: CLINIC | Age: 74
Discharge: HOME | End: 2024-05-16
Payer: MEDICARE

## 2024-05-16 VITALS — WEIGHT: 167 LBS | BODY MASS INDEX: 35.05 KG/M2 | HEIGHT: 58 IN

## 2024-05-16 DIAGNOSIS — Z12.31 VISIT FOR SCREENING MAMMOGRAM: ICD-10-CM

## 2024-05-16 PROCEDURE — 77063 BREAST TOMOSYNTHESIS BI: CPT | Performed by: RADIOLOGY

## 2024-05-16 PROCEDURE — 77067 SCR MAMMO BI INCL CAD: CPT | Performed by: RADIOLOGY

## 2024-05-16 PROCEDURE — 77067 SCR MAMMO BI INCL CAD: CPT

## 2024-05-30 ENCOUNTER — APPOINTMENT (OUTPATIENT)
Dept: PRIMARY CARE | Facility: CLINIC | Age: 74
End: 2024-05-30
Payer: MEDICARE

## 2024-06-29 PROBLEM — Z00.00 MEDICARE ANNUAL WELLNESS VISIT, SUBSEQUENT: Status: RESOLVED | Noted: 2024-03-19 | Resolved: 2024-06-29

## 2024-07-03 ENCOUNTER — APPOINTMENT (OUTPATIENT)
Dept: RHEUMATOLOGY | Facility: CLINIC | Age: 74
End: 2024-07-03
Payer: MEDICARE

## 2024-07-03 ENCOUNTER — LAB (OUTPATIENT)
Dept: LAB | Facility: LAB | Age: 74
End: 2024-07-03
Payer: MEDICARE

## 2024-07-03 VITALS
SYSTOLIC BLOOD PRESSURE: 136 MMHG | WEIGHT: 165.3 LBS | OXYGEN SATURATION: 96 % | BODY MASS INDEX: 32.45 KG/M2 | TEMPERATURE: 97.3 F | HEIGHT: 60 IN | HEART RATE: 77 BPM | DIASTOLIC BLOOD PRESSURE: 79 MMHG

## 2024-07-03 DIAGNOSIS — E78.2 MIXED HYPERLIPIDEMIA: ICD-10-CM

## 2024-07-03 DIAGNOSIS — I10 BENIGN ESSENTIAL HYPERTENSION: ICD-10-CM

## 2024-07-03 DIAGNOSIS — M15.9 GENERALIZED OSTEOARTHRITIS: Primary | ICD-10-CM

## 2024-07-03 DIAGNOSIS — L98.491: ICD-10-CM

## 2024-07-03 DIAGNOSIS — N32.81 OVERACTIVE BLADDER: ICD-10-CM

## 2024-07-03 DIAGNOSIS — L08.9: ICD-10-CM

## 2024-07-03 DIAGNOSIS — Z79.1 NSAID LONG-TERM USE: ICD-10-CM

## 2024-07-03 PROBLEM — Z13.6 SCREENING FOR HEART DISEASE: Status: ACTIVE | Noted: 2024-03-19

## 2024-07-03 LAB
ALBUMIN SERPL BCP-MCNC: 4.2 G/DL (ref 3.4–5)
ALP SERPL-CCNC: 105 U/L (ref 33–136)
ALT SERPL W P-5'-P-CCNC: 24 U/L (ref 7–45)
ANION GAP SERPL CALC-SCNC: 14 MMOL/L (ref 10–20)
AST SERPL W P-5'-P-CCNC: 22 U/L (ref 9–39)
BASOPHILS # BLD AUTO: 0.06 X10*3/UL (ref 0–0.1)
BASOPHILS NFR BLD AUTO: 0.9 %
BILIRUB SERPL-MCNC: 0.9 MG/DL (ref 0–1.2)
BUN SERPL-MCNC: 23 MG/DL (ref 6–23)
CALCIUM SERPL-MCNC: 9.4 MG/DL (ref 8.6–10.6)
CHLORIDE SERPL-SCNC: 103 MMOL/L (ref 98–107)
CHOLEST SERPL-MCNC: 192 MG/DL (ref 0–199)
CHOLESTEROL/HDL RATIO: 2.9
CO2 SERPL-SCNC: 27 MMOL/L (ref 21–32)
CREAT SERPL-MCNC: 0.67 MG/DL (ref 0.5–1.05)
EGFRCR SERPLBLD CKD-EPI 2021: >90 ML/MIN/1.73M*2
EOSINOPHIL # BLD AUTO: 0.12 X10*3/UL (ref 0–0.4)
EOSINOPHIL NFR BLD AUTO: 1.9 %
ERYTHROCYTE [DISTWIDTH] IN BLOOD BY AUTOMATED COUNT: 13.8 % (ref 11.5–14.5)
GLUCOSE SERPL-MCNC: 87 MG/DL (ref 74–99)
HCT VFR BLD AUTO: 47.3 % (ref 36–46)
HDLC SERPL-MCNC: 66.3 MG/DL
HGB BLD-MCNC: 15.6 G/DL (ref 12–16)
IMM GRANULOCYTES # BLD AUTO: 0.02 X10*3/UL (ref 0–0.5)
IMM GRANULOCYTES NFR BLD AUTO: 0.3 % (ref 0–0.9)
LDLC SERPL CALC-MCNC: 110 MG/DL
LYMPHOCYTES # BLD AUTO: 1.29 X10*3/UL (ref 0.8–3)
LYMPHOCYTES NFR BLD AUTO: 20 %
MCH RBC QN AUTO: 30 PG (ref 26–34)
MCHC RBC AUTO-ENTMCNC: 33 G/DL (ref 32–36)
MCV RBC AUTO: 91 FL (ref 80–100)
MONOCYTES # BLD AUTO: 0.48 X10*3/UL (ref 0.05–0.8)
MONOCYTES NFR BLD AUTO: 7.4 %
NEUTROPHILS # BLD AUTO: 4.49 X10*3/UL (ref 1.6–5.5)
NEUTROPHILS NFR BLD AUTO: 69.5 %
NON HDL CHOLESTEROL: 126 MG/DL (ref 0–149)
NRBC BLD-RTO: 0 /100 WBCS (ref 0–0)
PLATELET # BLD AUTO: 285 X10*3/UL (ref 150–450)
POTASSIUM SERPL-SCNC: 4.1 MMOL/L (ref 3.5–5.3)
PROT SERPL-MCNC: 6.8 G/DL (ref 6.4–8.2)
RBC # BLD AUTO: 5.2 X10*6/UL (ref 4–5.2)
SODIUM SERPL-SCNC: 140 MMOL/L (ref 136–145)
TRIGL SERPL-MCNC: 81 MG/DL (ref 0–149)
VLDL: 16 MG/DL (ref 0–40)
WBC # BLD AUTO: 6.5 X10*3/UL (ref 4.4–11.3)

## 2024-07-03 PROCEDURE — 3008F BODY MASS INDEX DOCD: CPT | Performed by: INTERNAL MEDICINE

## 2024-07-03 PROCEDURE — 1036F TOBACCO NON-USER: CPT | Performed by: INTERNAL MEDICINE

## 2024-07-03 PROCEDURE — 1160F RVW MEDS BY RX/DR IN RCRD: CPT | Performed by: INTERNAL MEDICINE

## 2024-07-03 PROCEDURE — 1159F MED LIST DOCD IN RCRD: CPT | Performed by: INTERNAL MEDICINE

## 2024-07-03 PROCEDURE — 99214 OFFICE O/P EST MOD 30 MIN: CPT | Performed by: INTERNAL MEDICINE

## 2024-07-03 PROCEDURE — 85025 COMPLETE CBC W/AUTO DIFF WBC: CPT

## 2024-07-03 PROCEDURE — 36415 COLL VENOUS BLD VENIPUNCTURE: CPT

## 2024-07-03 PROCEDURE — 80053 COMPREHEN METABOLIC PANEL: CPT

## 2024-07-03 PROCEDURE — 1123F ACP DISCUSS/DSCN MKR DOCD: CPT | Performed by: INTERNAL MEDICINE

## 2024-07-03 PROCEDURE — 3078F DIAST BP <80 MM HG: CPT | Performed by: INTERNAL MEDICINE

## 2024-07-03 PROCEDURE — 3075F SYST BP GE 130 - 139MM HG: CPT | Performed by: INTERNAL MEDICINE

## 2024-07-03 PROCEDURE — 80061 LIPID PANEL: CPT

## 2024-07-03 RX ORDER — TROSPIUM CHLORIDE 20 MG/1
1 TABLET, FILM COATED ORAL
COMMUNITY
Start: 2024-06-28

## 2024-07-03 ASSESSMENT — ENCOUNTER SYMPTOMS
SHORTNESS OF BREATH: 0
WOUND: 1
MYALGIAS: 0
FEVER: 0
FATIGUE: 0
WEAKNESS: 0
COLOR CHANGE: 0
COUGH: 0
NUMBNESS: 0
BACK PAIN: 0
ARTHRALGIAS: 1
JOINT SWELLING: 1

## 2024-07-03 ASSESSMENT — PATIENT HEALTH QUESTIONNAIRE - PHQ9
1. LITTLE INTEREST OR PLEASURE IN DOING THINGS: NOT AT ALL
2. FEELING DOWN, DEPRESSED OR HOPELESS: NOT AT ALL
SUM OF ALL RESPONSES TO PHQ9 QUESTIONS 1 AND 2: 0
2. FEELING DOWN, DEPRESSED OR HOPELESS: NOT AT ALL
1. LITTLE INTEREST OR PLEASURE IN DOING THINGS: NOT AT ALL
SUM OF ALL RESPONSES TO PHQ9 QUESTIONS 1 AND 2: 0

## 2024-07-03 NOTE — PROGRESS NOTES
[facility] RHEUMATOLOGY     AND INTERNAL MEDICINE    RHEUMATOLOGY PROGRESS NOTE  Kimberly Segundo 73 y.o. female  Chief Complaint   Patient presents with    Arthritis       SUBJECTIVE  Since last seen, has developed a wound infection/ulcer on R ankle.  Has been on antibiotics for 5 weeks---initially erythema spread to top of foot but now settling down.   Being managed by her surgeon.    Has developed trigger finger of L hand but otherwise her arthritis has been stable      Records since last seen reviewed in [facility], [facility] and [facility] Record  Patient Active Problem List    Diagnosis Date Noted    NSAID long-term use 07/03/2024    Infected skin ulcer limited to breakdown of skin (Multi) 07/03/2024    Screening for multiple conditions 03/19/2024    Screening for heart disease 03/19/2024    History of right shoulder replacement 03/19/2024    Class 1 obesity due to excess calories with serious comorbidity and body mass index (BMI) of 34.0 to 34.9 in adult 03/19/2024    Arthritis, lumbar spine 10/15/2023    Benign essential hypertension 10/15/2023    Calcific tendinitis of shoulder 10/15/2023    Ganglion cyst of dorsum of left wrist 10/15/2023    Generalized osteoarthritis 10/15/2023    Mixed hyperlipidemia 10/15/2023    Osteopenia 10/15/2023    Overactive bladder 10/15/2023    Positive FIT (fecal immunochemical test) 10/15/2023    Sensorineural hearing loss, bilateral 10/15/2023    Venous stasis dermatitis of left lower extremity 10/15/2023    Wears hearing aid 10/15/2023    Actinic keratosis 06/17/2021    Other seborrheic keratosis 06/17/2021    Hemangioma of skin and subcutaneous tissue 06/17/2021    Melanocytic nevi of trunk 06/17/2021    Neoplasm of uncertain behavior of skin 06/17/2021    Other melanin hyperpigmentation 06/17/2021    Scar condition and fibrosis of skin 06/17/2021     Past Medical History:   Diagnosis Date    Basal cell carcinoma of  skin of other parts of face 06/17/2021    Carpal tunnel syndrome, bilateral upper limbs 10/10/2018    Bilateral carpal tunnel syndrome    COVID-19 12/20/2021    COVID-19 virus infection    DVT (deep venous thrombosis) (Multi)     s/p knee surgery, was briefly on Lovenox, no further issues,    Fibrocystic breast 1974    HLD (hyperlipidemia)     managed on meds    [place] (hard of hearing)     bilateral hearing aids    HTN (hypertension)     managed on meds    Medicare annual wellness visit, subsequent 03/19/2024    OA (osteoarthritis)     OAB (overactive bladder)     managed on meds    Osteopenia     Personal history of other malignant neoplasm of skin     Personal history of malignant neoplasm of skin     Current Outpatient Medications   Medication Instructions    amLODIPine (NORVASC) 10 mg, oral, Daily    atorvastatin (LIPITOR) 40 mg, oral, Nightly    calcium carbonate/vitamin D3 (CALCIUM 600 + D,3, ORAL) 1 capsule, oral, 2 times daily    cholecalciferol (VITAMIN D3) 50 mcg, oral, Daily    diclofenac (VOLTAREN) 75 mg, oral, 2 times daily PRN, Do not crush, chew, or split.    diclofenac sodium (VOLTAREN) 4 g, Topical, 4 times daily PRN    lisinopril 40 mg, oral, Daily, as directed    methocarbamol (ROBAXIN) 750 mg, oral, 3 times daily    trospium (Sanctura) 20 mg tablet 1 tablet, oral, Every 12 hours scheduled (0630,1830)     Allergies   Allergen Reactions    Ciprofloxacin Unknown     It was so long ago I don't remember    Tramadol Nausea Only and Nausea/vomiting     Review of Systems   Constitutional:  Negative for fatigue and fever.   Respiratory:  Negative for cough and shortness of breath.    Cardiovascular:  Negative for leg swelling.   Musculoskeletal:  Positive for arthralgias, gait problem and joint swelling. Negative for back pain and myalgias.   Skin:  Positive for wound. Negative for color change and rash.   Neurological:  Negative for weakness and numbness.     Social History     Tobacco Use    Smoking  status: Never    Smokeless tobacco: Never   Vaping Use    Vaping status: Never Used   Substance Use Topics    Alcohol use: Not Currently     Comment: very rarely    Drug use: Never     PHYSICAL EXAM  /79 (BP Location: Left arm, Patient Position: Sitting, BP Cuff Size: Adult)   Pulse 77   Temp 36.3 °C (97.3 °F) (Temporal)   Ht 1.524 m (5')   Wt 75 kg (165 lb 4.8 oz)   SpO2 96%   BMI 32.28 kg/m²   Physical Exam  Vitals reviewed.   Constitutional:       General: She is not in acute distress.     Appearance: Normal appearance.   HENT:      Head: Normocephalic and atraumatic.   Eyes:      Conjunctiva/sclera: Conjunctivae normal.   Pulmonary:      Effort: Pulmonary effort is normal. No respiratory distress.   Musculoskeletal:         General: Swelling present. No tenderness or deformity.      Cervical back: Normal range of motion.      Right lower leg: No edema.      Left lower leg: No edema.      Comments: Early OA in hands.  OA in CMC joint.  R shoulder s/p surgery.  Pt working on passive ROM in PT  Brace on legs.  --degenerative changes of feet and ankles  Using cane to ambulate  +trigger finger L 3rd finger   Skin:     Coloration: Skin is not pale.      Findings: No erythema or rash.      Comments: Small wound with decreased erythema (compared to a picture viewed on pt's smart phone).     Neurological:      General: No focal deficit present.      Mental Status: She is alert and oriented to person, place, and time. Mental status is at baseline.      Gait: Gait abnormal.   Psychiatric:         Mood and Affect: Mood normal.       Health Maintenance Due   Topic Date Due    Derm Melanoma Skin Check  Never done    Hepatitis C Screening  Never done    RSV Pregnant patients and/or  patients aged 60+ years (1 - 1-dose 60+ series) Never done    COVID-19 Vaccine (6 - 2023-24 season) 12/21/2023     Assessment/plan  Problem List Items Addressed This Visit       Generalized osteoarthritis - Primary    Current Assessment  & Plan     Arthritis is largely stable.   Has developed a trigger finger   Would hold off on any intervention that involves steroids until infection clears   Pt to continue NSAID         Overactive bladder    Current Assessment & Plan     Now on sanctura.   Feels it is working  Managed by urology         NSAID long-term use    Infected skin ulcer limited to breakdown of skin (Multi)    Current Assessment & Plan     On antibiotics.  Pt to continue to see surgeon for management          Follow up: __6___months

## 2024-07-03 NOTE — PATIENT INSTRUCTIONS
It was a pleasure to see you today  Please call if your symptoms worsen  Please review your summary for education and reminders.  Follow up at your next appointment.    If you had labs/xrays done today, you will be able to view on Peer5.   We will contact you when the results are reviewed for further discussion.  Please note that you may receive your results before I have had a chance to review.  Please know I will be contacting you for discussion  Homegoing instructions for all patient  A healthy lifestyle helps chronic diseases  These are all the goals you should strive to improve your overall health   Blood pressure <130/85   BMI of <30 or waist circumference that is 1/2 of your height   Fasting blood sugar <107 (if you are diabetic, aim for an A1c <6.4%_   LDL cholesterol <130   Avoid smoking   Manage your stress   Get your preventive exams   Get your immunizations     You are on an anti-inflammatory medication.  Always make sure you take this medication with food.   You increase your risk of an ulcer if not taken correctly.  Recent studies have shown there is an increased risk of heart attacks and stroke with chronic use.  Discontinue and see your doctor if you have any suspicious symptoms.    If possible, only take this medication on an as needed basis   You are on an anti-inflammatory medication.  Always make sure you take this medication with food.   You increase your risk of an ulcer if not taken correctly.  Recent studies have shown there is an increased risk of heart attacks and stroke with chronic use.  Discontinue and see your doctor if you have any suspicious symptoms.    If possible, only take this medication on an as needed basis

## 2024-07-03 NOTE — ASSESSMENT & PLAN NOTE
Arthritis is largely stable.   Has developed a trigger finger   Would hold off on any intervention that involves steroids until infection clears   Pt to continue NSAID

## 2024-09-17 ENCOUNTER — APPOINTMENT (OUTPATIENT)
Dept: PRIMARY CARE | Facility: CLINIC | Age: 74
End: 2024-09-17
Payer: MEDICARE

## 2024-09-17 VITALS
HEART RATE: 90 BPM | SYSTOLIC BLOOD PRESSURE: 132 MMHG | HEIGHT: 60 IN | WEIGHT: 169.5 LBS | BODY MASS INDEX: 33.28 KG/M2 | OXYGEN SATURATION: 98 % | DIASTOLIC BLOOD PRESSURE: 79 MMHG | TEMPERATURE: 97.1 F

## 2024-09-17 DIAGNOSIS — M15.9 GENERALIZED OSTEOARTHRITIS: ICD-10-CM

## 2024-09-17 DIAGNOSIS — I10 BENIGN ESSENTIAL HYPERTENSION: Primary | ICD-10-CM

## 2024-09-17 PROCEDURE — 99213 OFFICE O/P EST LOW 20 MIN: CPT | Performed by: INTERNAL MEDICINE

## 2024-09-17 PROCEDURE — 1160F RVW MEDS BY RX/DR IN RCRD: CPT | Performed by: INTERNAL MEDICINE

## 2024-09-17 PROCEDURE — 3075F SYST BP GE 130 - 139MM HG: CPT | Performed by: INTERNAL MEDICINE

## 2024-09-17 PROCEDURE — 1123F ACP DISCUSS/DSCN MKR DOCD: CPT | Performed by: INTERNAL MEDICINE

## 2024-09-17 PROCEDURE — 1159F MED LIST DOCD IN RCRD: CPT | Performed by: INTERNAL MEDICINE

## 2024-09-17 PROCEDURE — 3008F BODY MASS INDEX DOCD: CPT | Performed by: INTERNAL MEDICINE

## 2024-09-17 PROCEDURE — 3078F DIAST BP <80 MM HG: CPT | Performed by: INTERNAL MEDICINE

## 2024-09-17 RX ORDER — DICLOFENAC SODIUM 10 MG/G
4 GEL TOPICAL 4 TIMES DAILY PRN
Qty: 480 G | Refills: 11 | Status: SHIPPED | OUTPATIENT
Start: 2024-09-17 | End: 2025-09-17

## 2024-09-17 ASSESSMENT — PATIENT HEALTH QUESTIONNAIRE - PHQ9
2. FEELING DOWN, DEPRESSED OR HOPELESS: NOT AT ALL
1. LITTLE INTEREST OR PLEASURE IN DOING THINGS: NOT AT ALL
SUM OF ALL RESPONSES TO PHQ9 QUESTIONS 1 AND 2: 0

## 2024-09-17 NOTE — PROGRESS NOTES
CHIEF COMPLAINT  Hypertension    HISTORY OF PRESENT ILLNESS  Kimberly Segundo is a 74 y.o. female presents today for follow up of Hypertension    HPI    Currently dealing with a chronic infection in her right foot, on antibiotics.  Otherwise has been doing well.  She has a new grandson and will be going to [place] for the holidays to visit.    Needs refill on Voltaren gel.     REVIEW OF SYSTEMS  Review of Systems   Constitutional:  Negative for chills, fatigue and fever.   HENT:  Negative for sore throat.    Respiratory:  Negative for cough and shortness of breath.    Cardiovascular:  Negative for chest pain, palpitations and leg swelling.   Gastrointestinal:  Negative for abdominal pain, constipation, diarrhea, nausea and vomiting.   Genitourinary:  Negative for dysuria and hematuria.   Musculoskeletal:  Positive for arthralgias and gait problem.   Skin:  Negative for rash.   Neurological:  Negative for dizziness, light-headedness and headaches.   Psychiatric/Behavioral:  Negative for confusion.        ALLERGIES  Ciprofloxacin and Tramadol    MEDICATIONS  Current Outpatient Medications   Medication Instructions    amLODIPine (NORVASC) 10 mg, oral, Daily    atorvastatin (LIPITOR) 40 mg, oral, Nightly    calcium carbonate/vitamin D3 (CALCIUM 600 + D,3, ORAL) 1 capsule, oral, 2 times daily    cholecalciferol (VITAMIN D3) 50 mcg, oral, Daily    diclofenac (VOLTAREN) 75 mg, oral, 2 times daily PRN, Do not crush, chew, or split.    diclofenac sodium (VOLTAREN) 4 g, Topical, 4 times daily PRN    lisinopril 40 mg, oral, Daily, as directed    methocarbamol (ROBAXIN) 750 mg, oral, 3 times daily    trospium (Sanctura) 20 mg tablet 1 tablet, oral, Every 12 hours scheduled (0630,1830)       TOBACCO USE  Social History     Tobacco Use   Smoking Status Never   Smokeless Tobacco Never       DEPRESSION SCREEN  Over the past 2 weeks, how often have you been bothered by any of the following problems?  Little interest or pleasure in  doing things: Not at all  Feeling down, depressed, or hopeless: Not at all    SURGICAL HISTORY  Past Surgical History:  10/10/2018: ANKLE SURGERY      Comment:  Ankle Surgeryx4 per patient she has 3 metal plates and 4               screws  10/10/2018: BLADDER SURGERY      Comment:  Bladder Surgery, interstem placement  1994: BREAST BIOPSY; Left      Comment:  benign-pt doesn't remember  04/09/2019: CARPAL TUNNEL RELEASE      Comment:  Carpal tunnel surgery  12/28/2018: CARPAL TUNNEL RELEASE      Comment:  Carpal tunnel surgery  10/10/2018: HYSTERECTOMY      Comment:  Hysterectomy  10/10/2018: HYSTEROSCOPY      Comment:  Hysteroscopy With Endometrial Ablation  No date: HYSTEROSCOPY      Comment:  with endometrial ablation  12/04/2023: REVERSE TOTAL SHOULDER ARTHROPLASTY; Right  07/27/2022: TONSILLECTOMY      Comment:  Tonsillectomy with adenoidectomy  10/10/2018: TOTAL KNEE ARTHROPLASTY      Comment:  Total Knee Replacement Left  10/10/2018: TOTAL KNEE ARTHROPLASTY      Comment:  Total Knee Replacement Rightx2       OBJECTIVE    /79   Pulse 90   Temp 36.2 °C (97.1 °F)   Ht 1.524 m (5')   Wt 76.9 kg (169 lb 8 oz)   SpO2 98%   BMI 33.10 kg/m²    BMI: Estimated body mass index is 33.1 kg/m² as calculated from the following:    Height as of this encounter: 1.524 m (5').    Weight as of this encounter: 76.9 kg (169 lb 8 oz).    BP Readings from Last 3 Encounters:   09/17/24 132/79   07/03/24 136/79   03/19/24 136/74      Wt Readings from Last 3 Encounters:   09/17/24 76.9 kg (169 lb 8 oz)   07/03/24 75 kg (165 lb 4.8 oz)   05/16/24 75.8 kg (167 lb)        PHYSICAL EXAM  Physical Exam  Constitutional:       Appearance: Normal appearance.   HENT:      Head: Normocephalic and atraumatic.   Cardiovascular:      Rate and Rhythm: Normal rate and regular rhythm.      Pulses: Normal pulses.      Heart sounds: No murmur heard.  Pulmonary:      Effort: Pulmonary effort is normal. No respiratory distress.      Breath  sounds: Normal breath sounds. No wheezing.   Neurological:      Mental Status: She is alert and oriented to person, place, and time. Mental status is at baseline.      Gait: Gait abnormal.      Comments: AFO braces bilaterally   Psychiatric:         Mood and Affect: Mood normal.         Behavior: Behavior normal.         Thought Content: Thought content normal.         Judgment: Judgment normal.         ASSESSMENT AND PLAN  Assessment/Plan   Problem List Items Addressed This Visit       Benign essential hypertension - Primary    Generalized osteoarthritis    Relevant Medications    diclofenac sodium (Voltaren) 1 % gel       Hypertension - at goal.  Continue current medication and heart healthy diet.      Follows with Dr. Arshad for osteoarthritis.  Voltaren gel refilled.    Following with Dr. Delgado for osteomyelitis in right ankle.     Flu and COVID vaccines recommended annually.      Follow-up in 6 months.

## 2024-09-19 ENCOUNTER — APPOINTMENT (OUTPATIENT)
Dept: PRIMARY CARE | Facility: CLINIC | Age: 74
End: 2024-09-19
Payer: MEDICARE

## 2024-09-19 ASSESSMENT — ENCOUNTER SYMPTOMS
ABDOMINAL PAIN: 0
DYSURIA: 0
COUGH: 0
CONSTIPATION: 0
DIARRHEA: 0
SHORTNESS OF BREATH: 0
PALPITATIONS: 0
HEADACHES: 0
CONFUSION: 0
FEVER: 0
LIGHT-HEADEDNESS: 0
HEMATURIA: 0
VOMITING: 0
NAUSEA: 0
FATIGUE: 0
DIZZINESS: 0
CHILLS: 0
SORE THROAT: 0
ARTHRALGIAS: 1

## 2024-10-08 ENCOUNTER — APPOINTMENT (OUTPATIENT)
Dept: PRIMARY CARE | Facility: CLINIC | Age: 74
End: 2024-10-08
Payer: MEDICARE

## 2024-10-08 VITALS
HEIGHT: 60 IN | DIASTOLIC BLOOD PRESSURE: 88 MMHG | TEMPERATURE: 97.3 F | BODY MASS INDEX: 33.49 KG/M2 | OXYGEN SATURATION: 99 % | SYSTOLIC BLOOD PRESSURE: 145 MMHG | HEART RATE: 85 BPM | WEIGHT: 170.6 LBS

## 2024-10-08 DIAGNOSIS — Z01.818 PRE-OP EVALUATION: ICD-10-CM

## 2024-10-08 DIAGNOSIS — L97.319: Primary | ICD-10-CM

## 2024-10-08 PROCEDURE — 1160F RVW MEDS BY RX/DR IN RCRD: CPT | Performed by: INTERNAL MEDICINE

## 2024-10-08 PROCEDURE — 1159F MED LIST DOCD IN RCRD: CPT | Performed by: INTERNAL MEDICINE

## 2024-10-08 PROCEDURE — 3077F SYST BP >= 140 MM HG: CPT | Performed by: INTERNAL MEDICINE

## 2024-10-08 PROCEDURE — 1123F ACP DISCUSS/DSCN MKR DOCD: CPT | Performed by: INTERNAL MEDICINE

## 2024-10-08 PROCEDURE — 3079F DIAST BP 80-89 MM HG: CPT | Performed by: INTERNAL MEDICINE

## 2024-10-08 PROCEDURE — 1036F TOBACCO NON-USER: CPT | Performed by: INTERNAL MEDICINE

## 2024-10-08 PROCEDURE — 3008F BODY MASS INDEX DOCD: CPT | Performed by: INTERNAL MEDICINE

## 2024-10-08 PROCEDURE — 99213 OFFICE O/P EST LOW 20 MIN: CPT | Performed by: INTERNAL MEDICINE

## 2024-10-08 RX ORDER — DOXYCYCLINE HYCLATE 100 MG
100 TABLET ORAL 2 TIMES DAILY
COMMUNITY
Start: 2024-10-03 | End: 2024-10-13

## 2024-10-08 ASSESSMENT — ENCOUNTER SYMPTOMS
VOMITING: 0
COUGH: 0
NAUSEA: 0
HEADACHES: 0
DIZZINESS: 0
DIARRHEA: 0
FATIGUE: 0
LIGHT-HEADEDNESS: 0
ABDOMINAL PAIN: 0
WOUND: 1
HEMATURIA: 0
CHILLS: 0
PALPITATIONS: 0
ARTHRALGIAS: 1
CONFUSION: 0
CONSTIPATION: 0
SHORTNESS OF BREATH: 0
FEVER: 0
DYSURIA: 0

## 2024-10-08 ASSESSMENT — PATIENT HEALTH QUESTIONNAIRE - PHQ9
SUM OF ALL RESPONSES TO PHQ9 QUESTIONS 1 AND 2: 0
2. FEELING DOWN, DEPRESSED OR HOPELESS: NOT AT ALL
1. LITTLE INTEREST OR PLEASURE IN DOING THINGS: NOT AT ALL

## 2024-10-08 NOTE — PROGRESS NOTES
CHIEF COMPLAINT  Pre-op Exam    HISTORY OF PRESENT ILLNESS  Kimberly Segundo is a 74 y.o. female presents today for follow up of Pre-op Exam    HPI    Scheduled for right ankle I&D per podiatrist Dr. Delgado on 10/23/24.    Chronic infection, currently on doxycycline.  She has chosen to delay surgery until then because she is watching her grandkids while parents are away for trip until 10/22/24.    No problems with chest pain or shortness of breath.  BP is typically well controlled.  She will stop Voltaren 1 week prior to surgery. Had right shoulder replacement in December 2023 and did very well, no complications from anesthesia.     REVIEW OF SYSTEMS  Review of Systems   Constitutional:  Negative for chills, fatigue and fever.   Respiratory:  Negative for cough and shortness of breath.    Cardiovascular:  Negative for chest pain, palpitations and leg swelling.   Gastrointestinal:  Negative for abdominal pain, constipation, diarrhea, nausea and vomiting.   Genitourinary:  Negative for dysuria and hematuria.   Musculoskeletal:  Positive for arthralgias and gait problem.   Skin:  Positive for wound (chronic right ankle infection).   Neurological:  Negative for dizziness, light-headedness and headaches.   Psychiatric/Behavioral:  Negative for confusion.        ALLERGIES  Ciprofloxacin and Tramadol    MEDICATIONS  Current Outpatient Medications   Medication Instructions    amLODIPine (NORVASC) 10 mg, oral, Daily    atorvastatin (LIPITOR) 40 mg, oral, Nightly    calcium carbonate/vitamin D3 (CALCIUM 600 + D,3, ORAL) 1 capsule, oral, 2 times daily    cholecalciferol (VITAMIN D3) 50 mcg, oral, Daily    diclofenac (VOLTAREN) 75 mg, oral, 2 times daily PRN, Do not crush, chew, or split.    diclofenac sodium (VOLTAREN) 4 g, Topical, 4 times daily PRN    doxycycline (VIBRA-TABS) 100 mg, oral, 2 times daily    lisinopril 40 mg, oral, Daily, as directed    methocarbamol (ROBAXIN) 750 mg, oral, 3 times daily    trospium  (Sanctura) 20 mg tablet 1 tablet, oral, Every 12 hours scheduled (0630,1830)       TOBACCO USE  Social History     Tobacco Use   Smoking Status Never   Smokeless Tobacco Never       DEPRESSION SCREEN  Over the past 2 weeks, how often have you been bothered by any of the following problems?  Little interest or pleasure in doing things: Not at all  Feeling down, depressed, or hopeless: Not at all    SURGICAL HISTORY  Past Surgical History:  10/10/2018: ANKLE SURGERY      Comment:  Ankle Surgeryx4 per patient she has 3 metal plates and 4               screws  10/10/2018: BLADDER SURGERY      Comment:  Bladder Surgery, interstem placement  1994: BREAST BIOPSY; Left      Comment:  benign-pt doesn't remember  04/09/2019: CARPAL TUNNEL RELEASE      Comment:  Carpal tunnel surgery  12/28/2018: CARPAL TUNNEL RELEASE      Comment:  Carpal tunnel surgery  10/10/2018: HYSTERECTOMY      Comment:  Hysterectomy  10/10/2018: HYSTEROSCOPY      Comment:  Hysteroscopy With Endometrial Ablation  No date: HYSTEROSCOPY      Comment:  with endometrial ablation  12/04/2023: REVERSE TOTAL SHOULDER ARTHROPLASTY; Right  07/27/2022: TONSILLECTOMY      Comment:  Tonsillectomy with adenoidectomy  10/10/2018: TOTAL KNEE ARTHROPLASTY      Comment:  Total Knee Replacement Left  10/10/2018: TOTAL KNEE ARTHROPLASTY      Comment:  Total Knee Replacement Rightx2       OBJECTIVE    /88   Pulse 85   Temp 36.3 °C (97.3 °F)   Ht 1.524 m (5')   Wt 77.4 kg (170 lb 9.6 oz)   SpO2 99%   BMI 33.32 kg/m²    BMI: Estimated body mass index is 33.32 kg/m² as calculated from the following:    Height as of this encounter: 1.524 m (5').    Weight as of this encounter: 77.4 kg (170 lb 9.6 oz).    BP Readings from Last 3 Encounters:   10/08/24 145/88   09/17/24 132/79   07/03/24 136/79      Wt Readings from Last 3 Encounters:   10/08/24 77.4 kg (170 lb 9.6 oz)   09/17/24 76.9 kg (169 lb 8 oz)   07/03/24 75 kg (165 lb 4.8 oz)      Lab Results   Component  "Value Date    WBC 6.5 07/03/2024    HGB 15.6 07/03/2024    HCT 47.3 (H) 07/03/2024    MCV 91 07/03/2024     07/03/2024     Lab Results   Component Value Date    GLUCOSE 87 07/03/2024    CALCIUM 9.4 07/03/2024     07/03/2024    K 4.1 07/03/2024    CO2 27 07/03/2024     07/03/2024    BUN 23 07/03/2024    CREATININE 0.67 07/03/2024     Lab Results   Component Value Date    ALT 24 07/03/2024    AST 22 07/03/2024    ALKPHOS 105 07/03/2024    BILITOT 0.9 07/03/2024     Lab Results   Component Value Date    CHOL 192 07/03/2024    CHOL 198 03/31/2023    CHOL 203 (H) 03/29/2022     Lab Results   Component Value Date    HDL 66.3 07/03/2024    HDL 68.2 03/31/2023    HDL 66.9 03/29/2022     Lab Results   Component Value Date    LDLCALC 110 (H) 07/03/2024     Lab Results   Component Value Date    TRIG 81 07/03/2024    TRIG 111 03/31/2023    TRIG 92 03/29/2022     No components found for: \"CHOLHDL\"    PHYSICAL EXAM  Physical Exam  Constitutional:       Appearance: Normal appearance.   HENT:      Head: Normocephalic and atraumatic.   Cardiovascular:      Rate and Rhythm: Normal rate and regular rhythm.      Pulses: Normal pulses.      Heart sounds: No murmur heard.  Pulmonary:      Effort: Pulmonary effort is normal. No respiratory distress.      Breath sounds: Normal breath sounds. No wheezing.   Neurological:      Mental Status: She is alert and oriented to person, place, and time. Mental status is at baseline.      Gait: Gait abnormal.      Comments: AFO braces bilaterally   Psychiatric:         Mood and Affect: Mood normal.         Behavior: Behavior normal.         Thought Content: Thought content normal.         Judgment: Judgment normal.          ASSESSMENT AND PLAN  Assessment/Plan   Problem List Items Addressed This Visit       Pre-op evaluation    Chronic ulcer of right ankle (Multi) - Primary     Pre-op evaluation, I&D right ankle ulcer scheduled 10/23/24 - BP mildly elevated today, usually well " controlled.  No issues with chest pain or shortness of breath.  EKG reviewed from Nov 2023, labs reviewed from July 2024.  Had right shoulder replacement 12/2023 and did well.  Advised to stop Voltaren and avoid other aspirin/NSAIDS for 1 week prior to surgery.  OK to proceed with surgery without any additional testing.  Paperwork completed and fax to Dr. Delgado's office.

## 2024-12-29 PROBLEM — Z13.6 SCREENING FOR HEART DISEASE: Status: RESOLVED | Noted: 2024-03-19 | Resolved: 2024-12-29

## 2024-12-29 PROBLEM — L98.491 INFECTED SKIN ULCER LIMITED TO BREAKDOWN OF SKIN: Status: RESOLVED | Noted: 2024-07-03 | Resolved: 2024-12-29

## 2024-12-29 PROBLEM — L08.9 INFECTED SKIN ULCER LIMITED TO BREAKDOWN OF SKIN: Status: RESOLVED | Noted: 2024-07-03 | Resolved: 2024-12-29

## 2024-12-29 PROBLEM — Z01.818 PRE-OP EVALUATION: Status: RESOLVED | Noted: 2024-10-08 | Resolved: 2024-12-29

## 2024-12-29 PROBLEM — Z13.89 SCREENING FOR MULTIPLE CONDITIONS: Status: RESOLVED | Noted: 2024-03-19 | Resolved: 2024-12-29

## 2025-01-03 ENCOUNTER — APPOINTMENT (OUTPATIENT)
Dept: RHEUMATOLOGY | Facility: CLINIC | Age: 75
End: 2025-01-03
Payer: MEDICARE

## 2025-01-03 VITALS
DIASTOLIC BLOOD PRESSURE: 79 MMHG | HEART RATE: 89 BPM | BODY MASS INDEX: 33.01 KG/M2 | TEMPERATURE: 99.7 F | OXYGEN SATURATION: 98 % | SYSTOLIC BLOOD PRESSURE: 140 MMHG | WEIGHT: 169 LBS

## 2025-01-03 DIAGNOSIS — Z79.1 NSAID LONG-TERM USE: ICD-10-CM

## 2025-01-03 DIAGNOSIS — M15.9 GENERALIZED OSTEOARTHRITIS: Primary | ICD-10-CM

## 2025-01-03 DIAGNOSIS — I10 BENIGN ESSENTIAL HYPERTENSION: ICD-10-CM

## 2025-01-03 DIAGNOSIS — E78.2 MIXED HYPERLIPIDEMIA: ICD-10-CM

## 2025-01-03 DIAGNOSIS — L89.513: ICD-10-CM

## 2025-01-03 PROCEDURE — 1160F RVW MEDS BY RX/DR IN RCRD: CPT | Performed by: INTERNAL MEDICINE

## 2025-01-03 PROCEDURE — 1036F TOBACCO NON-USER: CPT | Performed by: INTERNAL MEDICINE

## 2025-01-03 PROCEDURE — 3077F SYST BP >= 140 MM HG: CPT | Performed by: INTERNAL MEDICINE

## 2025-01-03 PROCEDURE — 3078F DIAST BP <80 MM HG: CPT | Performed by: INTERNAL MEDICINE

## 2025-01-03 PROCEDURE — 1159F MED LIST DOCD IN RCRD: CPT | Performed by: INTERNAL MEDICINE

## 2025-01-03 PROCEDURE — 99214 OFFICE O/P EST MOD 30 MIN: CPT | Performed by: INTERNAL MEDICINE

## 2025-01-03 PROCEDURE — 1124F ACP DISCUSS-NO DSCNMKR DOCD: CPT | Performed by: INTERNAL MEDICINE

## 2025-01-03 RX ORDER — METHOCARBAMOL 750 MG/1
750 TABLET, FILM COATED ORAL 2 TIMES DAILY
Qty: 180 TABLET | Refills: 3 | Status: SHIPPED | OUTPATIENT
Start: 2025-01-03 | End: 2026-01-03

## 2025-01-03 RX ORDER — ATORVASTATIN CALCIUM 40 MG/1
40 TABLET, FILM COATED ORAL NIGHTLY
Qty: 90 TABLET | Refills: 3 | Status: SHIPPED | OUTPATIENT
Start: 2025-01-03 | End: 2026-01-03

## 2025-01-03 RX ORDER — DICLOFENAC SODIUM 75 MG/1
75 TABLET, DELAYED RELEASE ORAL 2 TIMES DAILY PRN
Qty: 180 TABLET | Refills: 3 | Status: SHIPPED | OUTPATIENT
Start: 2025-01-03 | End: 2026-01-03

## 2025-01-03 RX ORDER — AMLODIPINE BESYLATE 10 MG/1
10 TABLET ORAL DAILY
Qty: 90 TABLET | Refills: 3 | Status: SHIPPED | OUTPATIENT
Start: 2025-01-03 | End: 2026-01-03

## 2025-01-03 RX ORDER — LISINOPRIL 40 MG/1
40 TABLET ORAL DAILY
Qty: 90 TABLET | Refills: 3 | Status: SHIPPED | OUTPATIENT
Start: 2025-01-03

## 2025-01-03 ASSESSMENT — ENCOUNTER SYMPTOMS
JOINT SWELLING: 0
MYALGIAS: 0
ARTHRALGIAS: 1
NUMBNESS: 0
FEVER: 0
COUGH: 0
BACK PAIN: 1
SHORTNESS OF BREATH: 0
WOUND: 1
FATIGUE: 1
WEAKNESS: 0
COLOR CHANGE: 0

## 2025-01-03 ASSESSMENT — PATIENT HEALTH QUESTIONNAIRE - PHQ9
1. LITTLE INTEREST OR PLEASURE IN DOING THINGS: NOT AT ALL
2. FEELING DOWN, DEPRESSED OR HOPELESS: NOT AT ALL
SUM OF ALL RESPONSES TO PHQ9 QUESTIONS 1 AND 2: 0

## 2025-01-03 NOTE — ASSESSMENT & PLAN NOTE
Refills done today  Orders:    lisinopril 40 mg tablet; Take 1 tablet (40 mg) by mouth once daily. as directed    amLODIPine (Norvasc) 10 mg tablet; Take 1 tablet (10 mg) by mouth once daily.

## 2025-01-03 NOTE — PATIENT INSTRUCTIONS
It was a pleasure to see you today  Please call if your symptoms worsen  Please review your summary for education and reminders.  Follow up at your next appointment.    If you had labs/xrays done today, you will be able to view on [employer].   We will contact you when the results are reviewed for further discussion.  Please note that you may receive your results before I have had a chance to review.  Please know I will be contacting you for discussion  Homegoing instructions for all patient  A healthy lifestyle helps chronic diseases  These are all the goals you should strive to improve your overall health   Blood pressure <130/85   BMI of <30 or waist circumference that is 1/2 of your height   Fasting blood sugar <107 (if you are diabetic, aim for an A1c <6.4%_   LDL cholesterol <130   Avoid smoking   Manage your stress   Get your preventive exams   Get your immunizations   You are on an anti-inflammatory medication.  Always make sure you take this medication with food.   You increase your risk of an ulcer if not taken correctly.  Recent studies have shown there is an increased risk of heart attacks and stroke with chronic use.  Discontinue and see your doctor if you have any suspicious symptoms.    If possible, only take this medication on an as needed basis

## 2025-01-03 NOTE — PROGRESS NOTES
[facility] RHEUMATOLOGY     AND INTERNAL MEDICINE    RHEUMATOLOGY PROGRESS NOTE    Kimberly Segundo 74 y.o. female  :  1950  PCP:  Viviana Moore MD    Chief Complaint   Patient presents with    Arthritis       SUBJECTIVE  Pt still struggling since she had foot surgery.  Had developed an ulcer on her foot which healed but now has a new one.  Ortho started her on cipro and doxy this week.     Rest of joints are stable.   Methocarbamol bid helps her back.     No other new areas of pain    Needs refills of most of her meds      Records since last seen reviewed in [facility], [facility] and [facility] Record  Patient Active Problem List    Diagnosis Date Noted    Pressure injury of right ankle, stage 3 (Multi) 10/08/2024    NSAID long-term use 2024    History of right shoulder replacement 2024    Class 1 obesity due to excess calories with serious comorbidity and body mass index (BMI) of 33.0 to 33.9 in adult 2024    Arthritis, lumbar spine 10/15/2023    Benign essential hypertension 10/15/2023    Calcific tendinitis of shoulder 10/15/2023    Ganglion cyst of dorsum of left wrist 10/15/2023    Generalized osteoarthritis 10/15/2023    Mixed hyperlipidemia 10/15/2023    Osteopenia 10/15/2023    Overactive bladder 10/15/2023    Positive FIT (fecal immunochemical test) 10/15/2023    Sensorineural hearing loss, bilateral 10/15/2023    Venous stasis dermatitis of left lower extremity 10/15/2023    Wears hearing aid 10/15/2023    Actinic keratosis 2021    Other seborrheic keratosis 2021    Hemangioma of skin and subcutaneous tissue 2021    Melanocytic nevi of trunk 2021    Neoplasm of uncertain behavior of skin 2021    Other melanin hyperpigmentation 2021    Scar condition and fibrosis of skin 2021     Past Medical History:   Diagnosis Date    Basal cell carcinoma of skin of other parts of face  06/17/2021    Carpal tunnel syndrome, bilateral upper limbs 10/10/2018    Bilateral carpal tunnel syndrome    COVID-19 12/20/2021    COVID-19 virus infection    DVT (deep venous thrombosis) (Multi)     s/p knee surgery, was briefly on Lovenox, no further issues,    Fibrocystic breast 1974    HLD (hyperlipidemia)     managed on meds    [place] (hard of hearing)     bilateral hearing aids    HTN (hypertension)     managed on meds    Infected skin ulcer limited to breakdown of skin 07/03/2024    Medicare annual wellness visit, subsequent 03/19/2024    OAB (overactive bladder)     managed on meds    Osteopenia     Personal history of other malignant neoplasm of skin     Personal history of malignant neoplasm of skin    Screening for heart disease 03/19/2024    3/19/2024 ASCVD risk 18.9%, intermediate  -currently on statin  -Counseled regarding CT Cardiac Score, patient declines at this time  -Heart healthy diet recommended (Mediterranean Style)      Screening for multiple conditions 03/19/2024    Depression screening completed using the PHQ2 questions with results documented in the chart/encounter          Current Outpatient Medications on File Prior to Visit   Medication Sig Dispense Refill    calcium carbonate/vitamin D3 (CALCIUM 600 + D,3, ORAL) Take 1 capsule by mouth 2 times a day.      cholecalciferol (Vitamin D3) 50 MCG (2000 UT) tablet Take 1 tablet (50 mcg) by mouth once daily.      diclofenac sodium (Voltaren) 1 % gel Apply 4.5 inches (4 g) topically 4 times a day as needed (pain). 480 g 11    trospium (Sanctura) 20 mg tablet Take 1 tablet (20 mg) by mouth every 12 hours.      [DISCONTINUED] atorvastatin (Lipitor) 40 mg tablet Take 1 tablet (40 mg) by mouth once daily at bedtime. 90 tablet 3    [DISCONTINUED] diclofenac (Voltaren) 75 mg EC tablet Take 1 tablet (75 mg) by mouth 2 times a day as needed (pain). Do not crush, chew, or split. 180 tablet 3    [DISCONTINUED] lisinopril 40 mg tablet Take 1 tablet (40 mg)  by mouth once daily. as directed 90 tablet 3    [DISCONTINUED] methocarbamol (Robaxin) 750 mg tablet Take 1 tablet (750 mg) by mouth 3 times a day. (Patient taking differently: Take 1 tablet (750 mg) by mouth 2 times a day.) 270 tablet 3    [DISCONTINUED] amLODIPine (Norvasc) 10 mg tablet Take 1 tablet (10 mg) by mouth once daily. 90 tablet 3     No current facility-administered medications on file prior to visit.     Allergies   Allergen Reactions    Ciprofloxacin Unknown     It was so long ago I don't remember    Tramadol Nausea Only and Nausea/vomiting     Social History     Tobacco Use    Smoking status: Never    Smokeless tobacco: Never   Vaping Use    Vaping status: Never Used   Substance Use Topics    Alcohol use: Not Currently     Comment: very rarely    Drug use: Never     Review of Systems   Constitutional:  Positive for fatigue. Negative for fever.   Respiratory:  Negative for cough and shortness of breath.    Cardiovascular:  Negative for leg swelling.   Musculoskeletal:  Positive for arthralgias, back pain and gait problem. Negative for joint swelling and myalgias.   Skin:  Positive for wound. Negative for color change and rash.   Neurological:  Negative for weakness and numbness.       PHYSICAL EXAM  /79   Pulse 89   Temp 37.6 °C (99.7 °F)   Wt 76.7 kg (169 lb)   SpO2 98%   BMI 33.01 kg/m²   Depression: Not at risk (1/3/2025)    PHQ-2     PHQ-2 Score: 0     Physical Exam  Vitals reviewed.   Constitutional:       General: She is not in acute distress.     Appearance: Normal appearance.   HENT:      Head: Normocephalic and atraumatic.   Eyes:      Conjunctiva/sclera: Conjunctivae normal.   Pulmonary:      Effort: Pulmonary effort is normal. No respiratory distress.   Musculoskeletal:         General: Swelling present. No tenderness or deformity.      Cervical back: Normal range of motion.      Right lower leg: No edema.      Left lower leg: No edema.      Comments: Early OA in hands.  OA in  CMC joint.  R shoulder s/p surgery.    Brace on legs.  --degenerative changes of feet and ankles  Using cane to ambulate     Skin:     Coloration: Skin is not pale.      Findings: No erythema or rash.      Comments: Wound not examined today   Neurological:      General: No focal deficit present.      Mental Status: She is alert and oriented to person, place, and time. Mental status is at baseline.      Gait: Gait abnormal.   Psychiatric:         Mood and Affect: Mood normal.           Health Maintenance Due   Topic Date Due    Derm Melanoma Skin Check  Never done    Hepatitis C Screening  Never done    DTaP/Tdap/Td Vaccines (2 - Td or Tdap) 10/21/2024       Assessment/plan  Assessment & Plan  Generalized osteoarthritis  Stable on NSAID and prn muscle relaxer.  Continue to monitor  Orders:    methocarbamol (Robaxin) 750 mg tablet; Take 1 tablet (750 mg) by mouth 2 times a day.    diclofenac (Voltaren) 75 mg EC tablet; Take 1 tablet (75 mg) by mouth 2 times a day as needed (pain). Do not crush, chew, or split.    NSAID long-term use         Benign essential hypertension  Refills done today  Orders:    lisinopril 40 mg tablet; Take 1 tablet (40 mg) by mouth once daily. as directed    amLODIPine (Norvasc) 10 mg tablet; Take 1 tablet (10 mg) by mouth once daily.    Mixed hyperlipidemia  Refill done today  Orders:    atorvastatin (Lipitor) 40 mg tablet; Take 1 tablet (40 mg) by mouth once daily at bedtime.    Pressure injury of right ankle, stage 3 (Multi)  Seeing ortho.  Has new pressure ulcer.  Currently on cipro and doxycycline.   Seeing ortho back in 2 weeks.   Pt noted to have slight elevation in temp and advised to monitor closely and monitor for worsening of wound.   If so, go to ortho sooner           Follow up: __6___months    Immunization History   Administered Date(s) Administered    Flu vaccine, quadrivalent, high-dose, preservative free, age 65y+ (FLUZONE) 09/14/2021, 10/18/2022, 10/26/2023    Flu vaccine,  trivalent, preservative free, HIGH-DOSE, age 65y+ (Fluzone) 09/27/2016, 10/10/2024    Flu vaccine, trivalent, preservative free, age 6 months and greater (Fluarix/Fluzone/Flulaval) 10/10/2015    Influenza Whole 09/15/2014    Influenza, Unspecified 10/10/2015, 09/11/2018, 09/15/2020, 10/26/2023    Influenza, seasonal, injectable 09/11/2018, 09/15/2020    Moderna COVID-19 vaccine, 12 years and older (50mcg/0.5mL)(Spikevax) 10/10/2024    Pfizer COVID-19 vaccine, 12 years and older, (30mcg/0.3mL) (Comirnaty) 10/26/2023    Pfizer COVID-19 vaccine, bivalent, age 12 years and older (30 mcg/0.3 mL) 10/18/2022, 10/26/2023    Pfizer Purple Cap SARS-CoV-2 03/06/2021, 03/26/2021, 09/28/2021    Pneumococcal Conjugate PCV 7 1950    Pneumococcal conjugate vaccine, 13-valent (PREVNAR 13) 10/27/2015    Pneumococcal polysaccharide vaccine, 23-valent, age 2 years and older (PNEUMOVAX 23) 12/03/2012, 09/14/2021    Tdap vaccine, age 7 year and older (BOOSTRIX, ADACEL) 10/21/2014    Zoster vaccine, recombinant, adult (SHINGRIX) 02/23/2019, 06/13/2019    Zoster, live 12/14/2012

## 2025-01-03 NOTE — ASSESSMENT & PLAN NOTE
Refill done today  Orders:    atorvastatin (Lipitor) 40 mg tablet; Take 1 tablet (40 mg) by mouth once daily at bedtime.

## 2025-01-03 NOTE — ASSESSMENT & PLAN NOTE
Stable on NSAID and prn muscle relaxer.  Continue to monitor  Orders:    methocarbamol (Robaxin) 750 mg tablet; Take 1 tablet (750 mg) by mouth 2 times a day.    diclofenac (Voltaren) 75 mg EC tablet; Take 1 tablet (75 mg) by mouth 2 times a day as needed (pain). Do not crush, chew, or split.

## 2025-01-03 NOTE — ASSESSMENT & PLAN NOTE
Seeing ortho.  Has new pressure ulcer.  Currently on cipro and doxycycline.   Seeing ortho back in 2 weeks.   Pt noted to have slight elevation in temp and advised to monitor closely and monitor for worsening of wound.   If so, go to ortho sooner

## 2025-01-06 DIAGNOSIS — M15.9 GENERALIZED OSTEOARTHRITIS: ICD-10-CM

## 2025-01-06 RX ORDER — DICLOFENAC SODIUM 10 MG/G
4 GEL TOPICAL 4 TIMES DAILY PRN
Qty: 480 G | Refills: 11 | Status: SHIPPED | OUTPATIENT
Start: 2025-01-06 | End: 2026-01-06

## 2025-01-08 ENCOUNTER — TELEPHONE (OUTPATIENT)
Dept: RHEUMATOLOGY | Facility: CLINIC | Age: 75
End: 2025-01-08
Payer: MEDICARE

## 2025-01-08 NOTE — TELEPHONE ENCOUNTER
Caitlin Phan MD  You 8 minutes ago (4:44 PM)      Tell her I sent in the 150 mg tablets.  I do not think it would work to break the 300 mg tablets in half because they are extended release, so just  the new ones if she can      Done denied.    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

## 2025-03-12 ENCOUNTER — APPOINTMENT (OUTPATIENT)
Dept: PRIMARY CARE | Facility: CLINIC | Age: 75
End: 2025-03-12
Payer: MEDICARE

## 2025-03-12 VITALS
OXYGEN SATURATION: 96 % | BODY MASS INDEX: 32.94 KG/M2 | TEMPERATURE: 98.2 F | HEART RATE: 85 BPM | HEIGHT: 60 IN | SYSTOLIC BLOOD PRESSURE: 124 MMHG | DIASTOLIC BLOOD PRESSURE: 74 MMHG | WEIGHT: 167.8 LBS

## 2025-03-12 DIAGNOSIS — Z01.818 PRE-OP EVALUATION: Primary | ICD-10-CM

## 2025-03-12 PROCEDURE — 3008F BODY MASS INDEX DOCD: CPT | Performed by: INTERNAL MEDICINE

## 2025-03-12 PROCEDURE — 3078F DIAST BP <80 MM HG: CPT | Performed by: INTERNAL MEDICINE

## 2025-03-12 PROCEDURE — 99213 OFFICE O/P EST LOW 20 MIN: CPT | Performed by: INTERNAL MEDICINE

## 2025-03-12 PROCEDURE — 1160F RVW MEDS BY RX/DR IN RCRD: CPT | Performed by: INTERNAL MEDICINE

## 2025-03-12 PROCEDURE — 1159F MED LIST DOCD IN RCRD: CPT | Performed by: INTERNAL MEDICINE

## 2025-03-12 PROCEDURE — 1036F TOBACCO NON-USER: CPT | Performed by: INTERNAL MEDICINE

## 2025-03-12 PROCEDURE — 3074F SYST BP LT 130 MM HG: CPT | Performed by: INTERNAL MEDICINE

## 2025-03-12 PROCEDURE — 1158F ADVNC CARE PLAN TLK DOCD: CPT | Performed by: INTERNAL MEDICINE

## 2025-03-12 PROCEDURE — 1123F ACP DISCUSS/DSCN MKR DOCD: CPT | Performed by: INTERNAL MEDICINE

## 2025-03-12 RX ORDER — CIPROFLOXACIN 500 MG/5ML
500 KIT ORAL 2 TIMES DAILY
COMMUNITY

## 2025-03-12 ASSESSMENT — ENCOUNTER SYMPTOMS
LIGHT-HEADEDNESS: 0
DIZZINESS: 0
SHORTNESS OF BREATH: 0
FEVER: 0
CONSTIPATION: 0
FATIGUE: 0
CHILLS: 0
CONFUSION: 0
DYSPHORIC MOOD: 0
ABDOMINAL PAIN: 0
DYSURIA: 0
COUGH: 0
VOMITING: 0
PALPITATIONS: 0
HEADACHES: 0
NAUSEA: 0
HEMATURIA: 0
DIARRHEA: 0

## 2025-03-12 NOTE — PROGRESS NOTES
CHIEF COMPLAINT  Pre-op Clearance (Surgery for RT LE on 3/17/25 - Dr. Delgado)    HISTORY OF PRESENT ILLNESS  Kimberly Segundo is a 74 y.o. female presents today for follow up of Pre-op Clearance (Surgery for RT LE on 3/17/25 - Dr. Delgado)    HPI    History reviewed and updated.  She is scheduled for removal of hardware from right ankle 3/17/25 per Dr. Delgado.  She has been his patient for many years, and recently had surgery with him in 2024.    No fever, chills, cough, dyspnea, or chest pain.  BP has been well controlled.  Labs reviewed from the past year, no issues.  She had visit with anesthesia yesterday, was given pre-op instructions including medications.     REVIEW OF SYSTEMS  Review of Systems   Constitutional:  Negative for chills, fatigue and fever.   Respiratory:  Negative for cough and shortness of breath.    Cardiovascular:  Negative for chest pain and palpitations.   Gastrointestinal:  Negative for abdominal pain, constipation, diarrhea, nausea and vomiting.   Genitourinary:  Negative for dysuria and hematuria.   Musculoskeletal:  Positive for gait problem.   Neurological:  Negative for dizziness, light-headedness and headaches.   Psychiatric/Behavioral:  Negative for confusion and dysphoric mood.        ALLERGIES  Tramadol    MEDICATIONS  Current Outpatient Medications   Medication Instructions    amLODIPine (NORVASC) 10 mg, oral, Daily    atorvastatin (LIPITOR) 40 mg, oral, Nightly    calcium carbonate/vitamin D3 (CALCIUM 600 + D,3, ORAL) 1 capsule, 2 times daily    cholecalciferol (VITAMIN D3) 50 mcg, Daily    ciprofloxacin (CIPRO) 500 mg, oral, 2 times daily    diclofenac (VOLTAREN) 75 mg, oral, 2 times daily PRN, Do not crush, chew, or split.    diclofenac sodium (VOLTAREN) 4 g, Topical, 4 times daily PRN    lisinopril 40 mg, oral, Daily, as directed    methocarbamol (ROBAXIN) 750 mg, oral, 2 times daily    trospium (Sanctura) 20 mg tablet 1 tablet, Every 12 hours scheduled (0630,1830)        TOBACCO USE  Social History     Tobacco Use   Smoking Status Never   Smokeless Tobacco Never       DEPRESSION SCREEN  Over the past 2 weeks, how often have you been bothered by any of the following problems?  Little interest or pleasure in doing things: Not at all  Feeling down, depressed, or hopeless: Not at all    SURGICAL HISTORY  Past Surgical History:  10/10/2018: ANKLE SURGERY      Comment:  Ankle Surgeryx4 per patient she has 3 metal plates and 4               screws  10/10/2018: BLADDER SURGERY      Comment:  Bladder Surgery, interstem placement  1994: BREAST BIOPSY; Left      Comment:  benign-pt doesn't remember  04/09/2019: CARPAL TUNNEL RELEASE      Comment:  Carpal tunnel surgery  12/28/2018: CARPAL TUNNEL RELEASE      Comment:  Carpal tunnel surgery  10/10/2018: HYSTERECTOMY      Comment:  Hysterectomy  10/10/2018: HYSTEROSCOPY      Comment:  Hysteroscopy With Endometrial Ablation  No date: HYSTEROSCOPY      Comment:  with endometrial ablation  12/04/2023: REVERSE TOTAL SHOULDER ARTHROPLASTY; Right  07/27/2022: TONSILLECTOMY      Comment:  Tonsillectomy with adenoidectomy  10/10/2018: TOTAL KNEE ARTHROPLASTY      Comment:  Total Knee Replacement Left  10/10/2018: TOTAL KNEE ARTHROPLASTY      Comment:  Total Knee Replacement Rightx2       OBJECTIVE    /74   Pulse 85   Temp 36.8 °C (98.2 °F)   Ht 1.524 m (5')   Wt 76.1 kg (167 lb 12.8 oz)   SpO2 96%   BMI 32.77 kg/m²    BMI: Estimated body mass index is 32.77 kg/m² as calculated from the following:    Height as of this encounter: 1.524 m (5').    Weight as of this encounter: 76.1 kg (167 lb 12.8 oz).    BP Readings from Last 3 Encounters:   03/12/25 124/74   01/03/25 140/79   10/08/24 145/88      Wt Readings from Last 3 Encounters:   03/12/25 76.1 kg (167 lb 12.8 oz)   01/03/25 76.7 kg (169 lb)   10/08/24 77.4 kg (170 lb 9.6 oz)        PHYSICAL EXAM  Physical Exam  Constitutional:       Appearance: Normal appearance.   HENT:      Head:  Normocephalic and atraumatic.   Cardiovascular:      Rate and Rhythm: Normal rate and regular rhythm.      Pulses: Normal pulses.      Heart sounds: No murmur heard.  Pulmonary:      Effort: Pulmonary effort is normal. No respiratory distress.      Breath sounds: Normal breath sounds. No wheezing.   Neurological:      Mental Status: She is alert and oriented to person, place, and time. Mental status is at baseline.      Gait: Gait abnormal.      Comments: AFO braces bilaterally   Psychiatric:         Mood and Affect: Mood normal.         Behavior: Behavior normal.         Thought Content: Thought content normal.         Judgment: Judgment normal.          ASSESSMENT AND PLAN  Assessment/Plan   Problem List Items Addressed This Visit    None  Visit Diagnoses       Pre-op evaluation    -  Primary          Pre-op evaluation:  - BP is normal  - no issues with chest pain or dyspnea  - had surgery on right ankle 10/23/24 without complication  - has already received counseling regarding administration of medications and meds to avoid  - available labs reviewed July 2024 - present, no issues  - Ok to proceed with surgery without any additional testing

## 2025-03-18 ENCOUNTER — APPOINTMENT (OUTPATIENT)
Dept: PRIMARY CARE | Facility: CLINIC | Age: 75
End: 2025-03-18
Payer: MEDICARE

## 2025-03-20 ENCOUNTER — APPOINTMENT (OUTPATIENT)
Dept: PRIMARY CARE | Facility: CLINIC | Age: 75
End: 2025-03-20
Payer: MEDICARE

## 2025-05-01 ENCOUNTER — TELEPHONE (OUTPATIENT)
Dept: RHEUMATOLOGY | Facility: CLINIC | Age: 75
End: 2025-05-01

## 2025-05-01 ENCOUNTER — APPOINTMENT (OUTPATIENT)
Dept: PRIMARY CARE | Facility: CLINIC | Age: 75
End: 2025-05-01
Payer: MEDICARE

## 2025-05-01 VITALS
BODY MASS INDEX: 33.04 KG/M2 | DIASTOLIC BLOOD PRESSURE: 82 MMHG | SYSTOLIC BLOOD PRESSURE: 136 MMHG | OXYGEN SATURATION: 97 % | WEIGHT: 168.3 LBS | HEART RATE: 56 BPM | HEIGHT: 60 IN | TEMPERATURE: 97.1 F

## 2025-05-01 DIAGNOSIS — M15.9 GENERALIZED OSTEOARTHRITIS: ICD-10-CM

## 2025-05-01 DIAGNOSIS — E78.2 MIXED HYPERLIPIDEMIA: Chronic | ICD-10-CM

## 2025-05-01 DIAGNOSIS — Z13.89 SCREENING FOR MULTIPLE CONDITIONS: ICD-10-CM

## 2025-05-01 DIAGNOSIS — I10 BENIGN ESSENTIAL HYPERTENSION: Chronic | ICD-10-CM

## 2025-05-01 DIAGNOSIS — Z12.31 VISIT FOR SCREENING MAMMOGRAM: ICD-10-CM

## 2025-05-01 DIAGNOSIS — Z00.00 MEDICARE ANNUAL WELLNESS VISIT, SUBSEQUENT: Primary | ICD-10-CM

## 2025-05-01 PROBLEM — D48.5 NEOPLASM OF UNCERTAIN BEHAVIOR OF SKIN: Status: RESOLVED | Noted: 2021-06-17 | Resolved: 2025-05-01

## 2025-05-01 PROBLEM — R19.5 POSITIVE FIT (FECAL IMMUNOCHEMICAL TEST): Status: RESOLVED | Noted: 2023-10-15 | Resolved: 2025-05-01

## 2025-05-01 PROCEDURE — 1170F FXNL STATUS ASSESSED: CPT | Performed by: INTERNAL MEDICINE

## 2025-05-01 PROCEDURE — 1159F MED LIST DOCD IN RCRD: CPT | Performed by: INTERNAL MEDICINE

## 2025-05-01 PROCEDURE — 1160F RVW MEDS BY RX/DR IN RCRD: CPT | Performed by: INTERNAL MEDICINE

## 2025-05-01 PROCEDURE — G0439 PPPS, SUBSEQ VISIT: HCPCS | Performed by: INTERNAL MEDICINE

## 2025-05-01 PROCEDURE — 1036F TOBACCO NON-USER: CPT | Performed by: INTERNAL MEDICINE

## 2025-05-01 PROCEDURE — 99214 OFFICE O/P EST MOD 30 MIN: CPT | Performed by: INTERNAL MEDICINE

## 2025-05-01 PROCEDURE — 1123F ACP DISCUSS/DSCN MKR DOCD: CPT | Performed by: INTERNAL MEDICINE

## 2025-05-01 PROCEDURE — 3078F DIAST BP <80 MM HG: CPT | Performed by: INTERNAL MEDICINE

## 2025-05-01 PROCEDURE — 3075F SYST BP GE 130 - 139MM HG: CPT | Performed by: INTERNAL MEDICINE

## 2025-05-01 PROCEDURE — G0444 DEPRESSION SCREEN ANNUAL: HCPCS | Performed by: INTERNAL MEDICINE

## 2025-05-01 PROCEDURE — 3008F BODY MASS INDEX DOCD: CPT | Performed by: INTERNAL MEDICINE

## 2025-05-01 RX ORDER — METHOCARBAMOL 750 MG/1
750 TABLET, FILM COATED ORAL 2 TIMES DAILY
Qty: 180 TABLET | Refills: 3 | Status: SHIPPED | OUTPATIENT
Start: 2025-05-01 | End: 2025-05-05

## 2025-05-01 ASSESSMENT — ENCOUNTER SYMPTOMS
NAUSEA: 0
CHILLS: 0
HEADACHES: 0
FEVER: 0
DYSURIA: 0
DYSPHORIC MOOD: 0
HEMATURIA: 0
DIZZINESS: 0
LIGHT-HEADEDNESS: 0
VOMITING: 0
CONFUSION: 0
SHORTNESS OF BREATH: 0
FATIGUE: 0
COUGH: 0
DIARRHEA: 0
ABDOMINAL PAIN: 0
CONSTIPATION: 0
PALPITATIONS: 0

## 2025-05-01 ASSESSMENT — ACTIVITIES OF DAILY LIVING (ADL)
MANAGING_FINANCES: INDEPENDENT
DOING_HOUSEWORK: INDEPENDENT
GROCERY_SHOPPING: INDEPENDENT
BATHING: INDEPENDENT
DRESSING: INDEPENDENT
TAKING_MEDICATION: INDEPENDENT

## 2025-05-01 ASSESSMENT — PATIENT HEALTH QUESTIONNAIRE - PHQ9
SUM OF ALL RESPONSES TO PHQ9 QUESTIONS 1 AND 2: 0
1. LITTLE INTEREST OR PLEASURE IN DOING THINGS: NOT AT ALL
2. FEELING DOWN, DEPRESSED OR HOPELESS: NOT AT ALL

## 2025-05-01 NOTE — TELEPHONE ENCOUNTER
Refill methocarbamol 1 2 times a day      [facility] Portal to Reg[facility]  [facility]  [place] [id number]  Phone: [phone] Fax: [phone]

## 2025-05-01 NOTE — PROGRESS NOTES
CHIEF COMPLAINT  Medicare Annual Wellness Visit Subsequent    HISTORY OF PRESENT ILLNESS  Kimberly Segundo is a 74 y.o. female presents today for follow up of Medicare Annual Wellness Visit Subsequent    HPI    Past Medical, Surgical, and Family History reviewed and updated in chart.  Reviewed all medications by prescribing practitioner or clinical pharmacist (such as prescriptions, OTCs, herbal therapies and supplements) and documented in the medical record.    Stable on current medications.  She is returning for visit with Dr. Arshad in June and will get her fasting labs at that time.  Has new grandchild in [facility] and is looking forward to visiting over summer.     REVIEW OF SYSTEMS  Review of Systems   Constitutional:  Negative for chills, fatigue and fever.   Respiratory:  Negative for cough and shortness of breath.    Cardiovascular:  Negative for chest pain and palpitations.   Gastrointestinal:  Negative for abdominal pain, constipation, diarrhea, nausea and vomiting.   Genitourinary:  Negative for dysuria and hematuria.   Musculoskeletal:  Positive for gait problem.   Neurological:  Negative for dizziness, light-headedness and headaches.   Psychiatric/Behavioral:  Negative for confusion and dysphoric mood.        ALLERGIES  Tramadol    MEDICATIONS  Current Outpatient Medications   Medication Instructions    amLODIPine (NORVASC) 10 mg, oral, Daily    atorvastatin (LIPITOR) 40 mg, oral, Nightly    calcium carbonate/vitamin D3 (CALCIUM 600 + D,3, ORAL) 1 capsule, 2 times daily    cholecalciferol (VITAMIN D3) 50 mcg, Daily    diclofenac (VOLTAREN) 75 mg, oral, 2 times daily PRN, Do not crush, chew, or split.    diclofenac sodium (VOLTAREN) 4 g, Topical, 4 times daily PRN    lisinopril 40 mg, oral, Daily, as directed    methocarbamol (ROBAXIN) 750 mg, oral, 2 times daily    trospium (Sanctura) 20 mg tablet 1 tablet, Every 12 hours scheduled (0630,1830)       TOBACCO USE  Tobacco Use  History[1]    DEPRESSION SCREEN  Over the past 2 weeks, how often have you been bothered by any of the following problems?  Little interest or pleasure in doing things: Not at all  Feeling down, depressed, or hopeless: Not at all    SURGICAL HISTORY  Past Surgical History:  10/10/2018: ANKLE SURGERY      Comment:  Ankle Surgeryx4 per patient she has 3 metal plates and 4               screws  10/10/2018: BLADDER SURGERY      Comment:  Bladder Surgery, interstem placement  1994: BREAST BIOPSY; Left      Comment:  benign-pt doesn't remember  04/09/2019: CARPAL TUNNEL RELEASE      Comment:  Carpal tunnel surgery  12/28/2018: CARPAL TUNNEL RELEASE      Comment:  Carpal tunnel surgery  10/10/2018: HYSTERECTOMY      Comment:  Hysterectomy  10/10/2018: HYSTEROSCOPY      Comment:  Hysteroscopy With Endometrial Ablation  No date: HYSTEROSCOPY      Comment:  with endometrial ablation  12/04/2023: REVERSE TOTAL SHOULDER ARTHROPLASTY; Right  07/27/2022: TONSILLECTOMY      Comment:  Tonsillectomy with adenoidectomy  10/10/2018: TOTAL KNEE ARTHROPLASTY      Comment:  Total Knee Replacement Left  10/10/2018: TOTAL KNEE ARTHROPLASTY      Comment:  Total Knee Replacement Rightx2       OBJECTIVE    /82   Pulse 56   Temp 36.2 °C (97.1 °F)   Ht 1.524 m (5')   Wt 76.3 kg (168 lb 4.8 oz)   SpO2 97%   BMI 32.87 kg/m²    BMI: Estimated body mass index is 32.87 kg/m² as calculated from the following:    Height as of this encounter: 1.524 m (5').    Weight as of this encounter: 76.3 kg (168 lb 4.8 oz).    BP Readings from Last 3 Encounters:   05/01/25 136/82   03/12/25 124/74   01/03/25 140/79      Wt Readings from Last 3 Encounters:   05/01/25 76.3 kg (168 lb 4.8 oz)   03/12/25 76.1 kg (167 lb 12.8 oz)   01/03/25 76.7 kg (169 lb)        PHYSICAL EXAM  Physical Exam  Constitutional:       Appearance: Normal appearance.   HENT:      Head: Normocephalic and atraumatic.   Neck:      Vascular: No carotid bruit.   Cardiovascular:       Rate and Rhythm: Normal rate and regular rhythm.      Pulses: Normal pulses.      Heart sounds: No murmur heard.  Pulmonary:      Effort: Pulmonary effort is normal. No respiratory distress.      Breath sounds: Normal breath sounds. No wheezing.   Abdominal:      General: There is no distension.      Palpations: Abdomen is soft.      Tenderness: There is no abdominal tenderness.   Neurological:      Mental Status: She is alert and oriented to person, place, and time. Mental status is at baseline.      Gait: Gait abnormal.      Comments: AFO braces bilaterally   Psychiatric:         Mood and Affect: Mood normal.         Behavior: Behavior normal.         Thought Content: Thought content normal.         Judgment: Judgment normal.          ASSESSMENT AND PLAN  Assessment/Plan   Problem List Items Addressed This Visit       Benign essential hypertension    Relevant Orders    Comprehensive Metabolic Panel    CBC and Auto Differential    Mixed hyperlipidemia    Relevant Orders    Lipid Panel    Medicare annual wellness visit, subsequent - Primary    Screening for multiple conditions    Overview   5 minutes were spent screening for depression using PHQ2/PHQ9 as documented in the chart.              Other Visit Diagnoses         Visit for screening mammogram        Relevant Orders    BI mammo bilateral screening tomosynthesis            Medicare Wellness Visit completed.     Hypertension - initially elevated, improved on recheck.  - continue current medication and home monitoring.    Mixed hyperlipidemia - continue statin.    Routine fasting labs - CBC, CMP, FLP.    CT Cardiac Calcium Scoring discussed - she will consider and can contact me for order if desired.      Follows with Dr. Arshad for osteoarthritis.     Mammogram 5/16/24, ordered for 2025.      Pap not indicated (s/p hyst).     Colon Cancer Screening - FIT positive March 2022.  - Follow-up colonoscopy  7/7/22, repeat 5 years.  Dr. Reilly.    Reviewed signs of  skin cancer and importance of sun protection.  Follows with dermatologist due to history of skin cancer (Mohs of left cheek).     Continue to stay current with routine dental and eye exams.     Flu and COVID vaccines recommended annually.  Tdap & RSV vaccine recommended at pharmacy.     Follow-up in 6 months.          [1]   Social History  Tobacco Use   Smoking Status Never   Smokeless Tobacco Never

## 2025-05-02 DIAGNOSIS — M15.9 GENERALIZED OSTEOARTHRITIS: ICD-10-CM

## 2025-05-05 RX ORDER — CHLORZOXAZONE 500 MG/1
500 TABLET ORAL 3 TIMES DAILY PRN
Qty: 270 TABLET | Refills: 3 | Status: SHIPPED | OUTPATIENT
Start: 2025-05-05 | End: 2026-05-05

## 2025-05-19 ENCOUNTER — HOSPITAL ENCOUNTER (OUTPATIENT)
Dept: RADIOLOGY | Facility: CLINIC | Age: 75
Discharge: HOME | End: 2025-05-19
Payer: MEDICARE

## 2025-05-19 VITALS — WEIGHT: 168.21 LBS | BODY MASS INDEX: 33.02 KG/M2 | HEIGHT: 60 IN

## 2025-05-19 DIAGNOSIS — Z12.31 VISIT FOR SCREENING MAMMOGRAM: ICD-10-CM

## 2025-05-19 PROCEDURE — 77067 SCR MAMMO BI INCL CAD: CPT

## 2025-05-19 PROCEDURE — 77067 SCR MAMMO BI INCL CAD: CPT | Performed by: RADIOLOGY

## 2025-05-19 PROCEDURE — 77063 BREAST TOMOSYNTHESIS BI: CPT | Performed by: RADIOLOGY

## 2025-06-04 ENCOUNTER — TELEPHONE (OUTPATIENT)
Dept: RHEUMATOLOGY | Facility: CLINIC | Age: 75
End: 2025-06-04
Payer: MEDICARE

## 2025-06-24 ENCOUNTER — TELEPHONE (OUTPATIENT)
Dept: PRIMARY CARE | Facility: CLINIC | Age: 75
End: 2025-06-24
Payer: MEDICARE

## 2025-06-24 DIAGNOSIS — J40 BRONCHITIS: Primary | ICD-10-CM

## 2025-06-24 RX ORDER — AZITHROMYCIN 250 MG/1
TABLET, FILM COATED ORAL
Qty: 6 TABLET | Refills: 0 | Status: SHIPPED | OUTPATIENT
Start: 2025-06-24 | End: 2025-06-30 | Stop reason: WASHOUT

## 2025-06-24 NOTE — TELEPHONE ENCOUNTER
Patient called stating she has a real bad cough x 1 wk.  Patient using sudafed cough and cold which helps a little.  For a while she's ok but then coughs and coughs. No fever.  Once in a while with coughing spell and deep cough a small amount of non colored phlegm.  No appts available with Dr. Moore.  Dr. Bennett has a few openings.    -  [id number] - [place], [place] - [address]   [address]  [facility] [address]  Phone: [phone] Fax: [phone]    Patient [phone]

## 2025-06-30 ENCOUNTER — APPOINTMENT (OUTPATIENT)
Dept: RHEUMATOLOGY | Facility: CLINIC | Age: 75
End: 2025-06-30
Payer: MEDICARE

## 2025-06-30 VITALS
BODY MASS INDEX: 32.55 KG/M2 | WEIGHT: 165.8 LBS | TEMPERATURE: 98.3 F | DIASTOLIC BLOOD PRESSURE: 79 MMHG | HEIGHT: 60 IN | OXYGEN SATURATION: 97 % | HEART RATE: 78 BPM | SYSTOLIC BLOOD PRESSURE: 138 MMHG

## 2025-06-30 DIAGNOSIS — M15.9 GENERALIZED OSTEOARTHRITIS: Primary | ICD-10-CM

## 2025-06-30 DIAGNOSIS — Z79.1 NSAID LONG-TERM USE: ICD-10-CM

## 2025-06-30 DIAGNOSIS — M47.816 ARTHRITIS, LUMBAR SPINE: ICD-10-CM

## 2025-06-30 DIAGNOSIS — L89.513: ICD-10-CM

## 2025-06-30 PROBLEM — Z00.00 MEDICARE ANNUAL WELLNESS VISIT, SUBSEQUENT: Status: RESOLVED | Noted: 2024-03-19 | Resolved: 2025-06-30

## 2025-06-30 PROBLEM — Z13.89 SCREENING FOR MULTIPLE CONDITIONS: Status: RESOLVED | Noted: 2024-03-19 | Resolved: 2025-06-30

## 2025-06-30 PROCEDURE — 3075F SYST BP GE 130 - 139MM HG: CPT | Performed by: INTERNAL MEDICINE

## 2025-06-30 PROCEDURE — 1036F TOBACCO NON-USER: CPT | Performed by: INTERNAL MEDICINE

## 2025-06-30 PROCEDURE — 99214 OFFICE O/P EST MOD 30 MIN: CPT | Performed by: INTERNAL MEDICINE

## 2025-06-30 PROCEDURE — 1159F MED LIST DOCD IN RCRD: CPT | Performed by: INTERNAL MEDICINE

## 2025-06-30 PROCEDURE — 3078F DIAST BP <80 MM HG: CPT | Performed by: INTERNAL MEDICINE

## 2025-06-30 PROCEDURE — 3008F BODY MASS INDEX DOCD: CPT | Performed by: INTERNAL MEDICINE

## 2025-06-30 PROCEDURE — 1160F RVW MEDS BY RX/DR IN RCRD: CPT | Performed by: INTERNAL MEDICINE

## 2025-06-30 RX ORDER — DARIFENACIN 15 MG/1
1 TABLET, EXTENDED RELEASE ORAL
COMMUNITY
Start: 2025-05-31

## 2025-06-30 ASSESSMENT — ENCOUNTER SYMPTOMS
EYES NEGATIVE: 1
COUGH: 0
MYALGIAS: 0
COLOR CHANGE: 0
BACK PAIN: 0
GASTROINTESTINAL NEGATIVE: 1
SHORTNESS OF BREATH: 0
FATIGUE: 0
ENDOCRINE NEGATIVE: 1
WEAKNESS: 0
FEVER: 0
ARTHRALGIAS: 1
NUMBNESS: 0
JOINT SWELLING: 0
PSYCHIATRIC NEGATIVE: 1

## 2025-06-30 NOTE — PROGRESS NOTES
[facility] RHEUMATOLOGY     AND INTERNAL MEDICINE    RHEUMATOLOGY PROGRESS NOTE    Kimberly Segundo 74 y.o. female  :  1950      Chief Complaint   Patient presents with    Osteoarthritis       SUBJECTIVE  Since last seen, had foot surgery complicated by the development of an ankle ulcer where the boot rubbed.  Going to wound clinic and slowly improving.   Feels foot surgery was a success (removal of plates and screws).  Rest of her arthritis is stable  Planning to travel to [place] to visit family.   To keep leg elevated during travel      Records since last seen reviewed in [facility], [facility] and [facility] Record  Patient Active Problem List    Diagnosis Date Noted    Pressure injury of right ankle, stage 3 (Multi) 10/08/2024    NSAID long-term use 2024    History of right shoulder replacement 2024    Class 1 obesity due to excess calories with serious comorbidity and body mass index (BMI) of 33.0 to 33.9 in adult 2024    Arthritis, lumbar spine 10/15/2023    Benign essential hypertension 10/15/2023    Calcific tendinitis of shoulder 10/15/2023    Ganglion cyst of dorsum of left wrist 10/15/2023    Generalized osteoarthritis 10/15/2023    Mixed hyperlipidemia 10/15/2023    Osteopenia 10/15/2023    Overactive bladder 10/15/2023    Sensorineural hearing loss, bilateral 10/15/2023    Venous stasis dermatitis of left lower extremity 10/15/2023    Wears hearing aid 10/15/2023    Actinic keratosis 2021    Other seborrheic keratosis 2021    Hemangioma of skin and subcutaneous tissue 2021    Melanocytic nevi of trunk 2021    Other melanin hyperpigmentation 2021    Scar condition and fibrosis of skin 2021     Medical History[1]  Medications Ordered Prior to Encounter[2]  RX Allergies[3]  Social History[4]  Review of Systems   Constitutional:  Negative for fatigue and  fever.   HENT: Negative.     Eyes: Negative.    Respiratory:  Negative for cough and shortness of breath.    Cardiovascular:  Negative for leg swelling.   Gastrointestinal: Negative.    Endocrine: Negative.    Genitourinary: Negative.    Musculoskeletal:  Positive for arthralgias and gait problem. Negative for back pain, joint swelling and myalgias.   Skin:  Negative for color change and rash.   Neurological:  Negative for weakness and numbness.   Psychiatric/Behavioral: Negative.         PHYSICAL EXAM  /79   Pulse 78   Temp 36.8 °C (98.3 °F)   Ht (!) 1.524 m (5')   Wt 75.2 kg (165 lb 12.8 oz)   SpO2 97%   BMI 32.38 kg/m²   Depression: Not at risk (6/30/2025)    PHQ-2     PHQ-2 Score: 0     Physical Exam  Vitals reviewed.   Constitutional:       General: She is not in acute distress.     Appearance: Normal appearance.   HENT:      Head: Normocephalic and atraumatic.   Eyes:      Conjunctiva/sclera: Conjunctivae normal.   Pulmonary:      Effort: Pulmonary effort is normal. No respiratory distress.   Musculoskeletal:         General: Swelling present. No tenderness or deformity.      Cervical back: Normal range of motion.      Right lower leg: No edema.      Left lower leg: No edema.      Comments:  OA in hands.  OA in CMC joint.  R shoulder s/p surgery.    Brace on leg L; boot R  --degenerative changes of feet and ankles  Using cane to ambulate     Skin:     Coloration: Skin is not pale.      Findings: No erythema or rash.      Comments: Wound not examined today   Neurological:      General: No focal deficit present.      Mental Status: She is alert and oriented to person, place, and time. Mental status is at baseline.      Gait: Gait abnormal.   Psychiatric:         Mood and Affect: Mood normal.           Health Maintenance Due   Topic Date Due    Hepatitis C Screening  Never done    DTaP/Tdap/Td Vaccines (2 - Td or Tdap) 10/21/2024    COVID-19 Vaccine (7 - 2024-25 season) 04/10/2025        Assessment/plan  Assessment & Plan  Generalized osteoarthritis  Arthritis, lumbar spine  Stable on NSAID therapy.  Continue with seeing ortho for evaluation  No signs of arthritis progression on exam.  Patient to continue medications for relief of symptoms and will continue to monitor usage and monitor for any untoward effects.   Encourage mobility and exercise to help range of motion with joints.   A healthy diet to maintain ideal weight helps overall joint pain.        Orders:    Follow Up In Rheumatology; Future    NSAID long-term use  Labs ordered by PCP to be done today  Orders:    Follow Up In Rheumatology; Future    Pressure injury of right ankle, stage 3 (Multi)  Continue with treatment from wound clinic           Follow up: ___6__months, sooner if change in symptoms    Immunization History   Administered Date(s) Administered    Flu vaccine, quadrivalent, high-dose, preservative free, age 65y+ (FLUZONE) 09/14/2021, 10/18/2022, 10/26/2023    Flu vaccine, trivalent, preservative free, HIGH-DOSE, age 65y+ (Fluzone) 09/27/2016, 10/10/2024    Flu vaccine, trivalent, preservative free, age 6 months and greater (Fluarix/Fluzone/Flulaval) 10/10/2015    Influenza Whole 09/15/2014    Influenza, Unspecified 10/10/2015, 09/11/2018, 09/15/2020, 10/26/2023    Influenza, seasonal, injectable 09/11/2018, 09/15/2020    Moderna COVID-19 vaccine, 12 years and older (50mcg/0.5mL)(Spikevax) 10/10/2024    Pfizer COVID-19 vaccine, 12 years and older, (30mcg/0.3mL) (Comirnaty) 10/26/2023    Pfizer COVID-19 vaccine, bivalent, age 12 years and older (30 mcg/0.3 mL) 10/18/2022, 10/26/2023    Pfizer Purple Cap SARS-CoV-2 03/06/2021, 03/26/2021, 09/28/2021    Pneumococcal Conjugate PCV 7 1950    Pneumococcal conjugate vaccine, 13-valent (PREVNAR 13) 10/27/2015    Pneumococcal polysaccharide vaccine, 23-valent, age 2 years and older (PNEUMOVAX 23) 12/03/2012, 09/14/2021    Tdap vaccine, age 7 year and older (BOOSTRIX, ADACEL)  10/21/2014    Zoster vaccine, recombinant, adult (SHINGRIX) 02/23/2019, 06/13/2019    Zoster, live 12/14/2012                    [1]   Past Medical History:  Diagnosis Date    Arthritis 2005    Basal cell carcinoma of skin of other parts of face 06/17/2021    Carpal tunnel syndrome, bilateral upper limbs 10/10/2018    Bilateral carpal tunnel syndrome    Coronary artery disease     COVID-19 12/20/2021    COVID-19 virus infection    DVT (deep venous thrombosis) (Multi)     s/p knee surgery, was briefly on Lovenox, no further issues,    Fibrocystic breast 1974    Hearing aid worn 2007    HLD (hyperlipidemia)     managed on meds    Ag[place]ux (hard of hearing)     bilateral hearing aids    HTN (hypertension)     managed on meds    Infected skin ulcer limited to breakdown of skin 07/03/2024    Medicare annual wellness visit, subsequent 03/19/2024    Neoplasm of uncertain behavior of skin 06/17/2021    OAB (overactive bladder)     managed on meds    Osteoarthritis     Osteopenia     Personal history of other malignant neoplasm of skin     Personal history of malignant neoplasm of skin    Positive FIT (fecal immunochemical test) 10/15/2023    Screening for heart disease 03/19/2024    3/19/2024 ASCVD risk 18.9%, intermediate  -currently on statin  -Counseled regarding CT Cardiac Score, patient declines at this time  -Heart healthy diet recommended (Mediterranean Style)      Screening for multiple conditions 03/19/2024    Depression screening completed using the PHQ2 questions with results documented in the chart/encounter         Screening for multiple conditions 03/19/2024    5 minutes were spent screening for depression using PHQ2/PHQ9 as documented in the chart.           [2]   Current Outpatient Medications on File Prior to Visit   Medication Sig Dispense Refill    darifenacin (Enablex) 15 mg 24 hr tablet Take 1 tablet (15 mg) by mouth early in the morning..      amLODIPine (Norvasc) 10 mg tablet Take 1 tablet (10 mg) by mouth  once daily. 90 tablet 3    atorvastatin (Lipitor) 40 mg tablet Take 1 tablet (40 mg) by mouth once daily at bedtime. 90 tablet 3    calcium carbonate/vitamin D3 (CALCIUM 600 + D,3, ORAL) Take 1 capsule by mouth 2 times a day.      chlorzoxazone (Parafon Forte) 500 mg tablet Take 1 tablet (500 mg) by mouth 3 times a day as needed for muscle spasms. 270 tablet 3    cholecalciferol (Vitamin D3) 50 MCG (2000 UT) tablet Take 1 tablet (50 mcg) by mouth once daily.      diclofenac (Voltaren) 75 mg EC tablet Take 1 tablet (75 mg) by mouth 2 times a day as needed (pain). Do not crush, chew, or split. 180 tablet 3    diclofenac sodium (Voltaren) 1 % gel Apply 4.5 inches (4 g) topically 4 times a day as needed (pain). 480 g 11    lisinopril 40 mg tablet Take 1 tablet (40 mg) by mouth once daily. as directed 90 tablet 3    [DISCONTINUED] azithromycin (Zithromax) 250 mg tablet Take 2 tablets (500 mg) by mouth once daily for 1 day, THEN 1 tablet (250 mg) once daily for 4 days. 6 tablet 0    [DISCONTINUED] trospium (Sanctura) 20 mg tablet Take 1 tablet (20 mg) by mouth every 12 hours.       No current facility-administered medications on file prior to visit.   [3]   Allergies  Allergen Reactions    Tramadol Nausea Only and Nausea/vomiting   [4]   Social History  Tobacco Use    Smoking status: Never    Smokeless tobacco: Never   Vaping Use    Vaping status: Never Used   Substance Use Topics    Alcohol use: Not Currently     Comment: very rarely    Drug use: Never

## 2025-06-30 NOTE — ASSESSMENT & PLAN NOTE
Stable on NSAID therapy.  Continue with seeing ortho for evaluation  No signs of arthritis progression on exam.  Patient to continue medications for relief of symptoms and will continue to monitor usage and monitor for any untoward effects.   Encourage mobility and exercise to help range of motion with joints.   A healthy diet to maintain ideal weight helps overall joint pain.        Orders:    Follow Up In Rheumatology; Future

## 2025-06-30 NOTE — PATIENT INSTRUCTIONS
It was a pleasure to see you today  Please call if your symptoms worsen  Please review your summary for education and reminders.  Follow up at your next appointment.    If you had labs/xrays done today, you will be able to view on [employer].   We will contact you when the results are reviewed for further discussion.  Please note that you may receive your results before I have had a chance to review.  Please know I will be contacting you for discussion  Homegoing instructions for all patient  A healthy lifestyle helps chronic diseases  These are all the goals you should strive to improve your overall health   Blood pressure <130/85   BMI of <30 or waist circumference that is 1/2 of your height   Fasting blood sugar <107 (if you are diabetic, aim for an A1c <6.4%_   LDL cholesterol <130   Avoid smoking   Manage your stress   Get your preventive exams   Get your immunizations   You are on an anti-inflammatory medication.  Always make sure you take this medication with food.   You increase your risk of an ulcer if not taken correctly.  Recent studies have shown there is an increased risk of heart attacks and stroke with chronic use.  Discontinue and see your doctor if you have any suspicious symptoms.    If possible, only take this medication on an as needed basis   Common Emergency Awareness Tips   IS IT A STROKE?   Act FAST and Check for these signs:   FACE Does the face look uneven?   ARM Does one arm drift down?   SPEECH Does their speech sound strange?   TIME Call 9-1-1 at any sign of stroke     Heart Attack Signs   Chest discomfort: Most heart attacks involve discomfort in the center of the chest and lasts more than a few minutes, or goes away and comes back. It can feel like uncomfortable pressure, squeezing, fullness or pain.   Discomfort in upper body: Symptoms can include pain or discomfort in one or both arms, back, neck, jaw or stomach.   Shortness of breath: With or without discomfort.   Other signs: Breaking  out in a cold sweat, nausea, or lightheaded.   Remember, MINUTES DO MATTER. If you experience any of these heart attack warning signs, call 9-1-1 to get immediate medical attention!

## 2025-07-01 LAB
ALBUMIN SERPL-MCNC: 4.4 G/DL (ref 3.6–5.1)
ALP SERPL-CCNC: 89 U/L (ref 37–153)
ALT SERPL-CCNC: 25 U/L (ref 6–29)
ANION GAP SERPL CALCULATED.4IONS-SCNC: 9 MMOL/L (CALC) (ref 7–17)
AST SERPL-CCNC: 23 U/L (ref 10–35)
BASOPHILS # BLD AUTO: 70 CELLS/UL (ref 0–200)
BASOPHILS NFR BLD AUTO: 1 %
BILIRUB SERPL-MCNC: 0.7 MG/DL (ref 0.2–1.2)
BUN SERPL-MCNC: 20 MG/DL (ref 7–25)
CALCIUM SERPL-MCNC: 9.3 MG/DL (ref 8.6–10.4)
CHLORIDE SERPL-SCNC: 104 MMOL/L (ref 98–110)
CHOLEST SERPL-MCNC: 176 MG/DL
CHOLEST/HDLC SERPL: 3.1 (CALC)
CO2 SERPL-SCNC: 27 MMOL/L (ref 20–32)
CREAT SERPL-MCNC: 0.55 MG/DL (ref 0.6–1)
EGFRCR SERPLBLD CKD-EPI 2021: 96 ML/MIN/1.73M2
EOSINOPHIL # BLD AUTO: 133 CELLS/UL (ref 15–500)
EOSINOPHIL NFR BLD AUTO: 1.9 %
ERYTHROCYTE [DISTWIDTH] IN BLOOD BY AUTOMATED COUNT: 13.4 % (ref 11–15)
GLUCOSE SERPL-MCNC: 83 MG/DL (ref 65–99)
HCT VFR BLD AUTO: 52.4 % (ref 35–45)
HDLC SERPL-MCNC: 57 MG/DL
HGB BLD-MCNC: 16.8 G/DL (ref 11.7–15.5)
LDLC SERPL CALC-MCNC: 100 MG/DL (CALC)
LYMPHOCYTES # BLD AUTO: 1267 CELLS/UL (ref 850–3900)
LYMPHOCYTES NFR BLD AUTO: 18.1 %
MCH RBC QN AUTO: 30.8 PG (ref 27–33)
MCHC RBC AUTO-ENTMCNC: 32.1 G/DL (ref 32–36)
MCV RBC AUTO: 96.1 FL (ref 80–100)
MONOCYTES # BLD AUTO: 490 CELLS/UL (ref 200–950)
MONOCYTES NFR BLD AUTO: 7 %
NEUTROPHILS # BLD AUTO: 5040 CELLS/UL (ref 1500–7800)
NEUTROPHILS NFR BLD AUTO: 72 %
NONHDLC SERPL-MCNC: 119 MG/DL (CALC)
PLATELET # BLD AUTO: 269 THOUSAND/UL (ref 140–400)
PMV BLD REES-ECKER: 10.9 FL (ref 7.5–12.5)
POTASSIUM SERPL-SCNC: 4.3 MMOL/L (ref 3.5–5.3)
PROT SERPL-MCNC: 6.9 G/DL (ref 6.1–8.1)
RBC # BLD AUTO: 5.45 MILLION/UL (ref 3.8–5.1)
SODIUM SERPL-SCNC: 140 MMOL/L (ref 135–146)
TRIGL SERPL-MCNC: 93 MG/DL
WBC # BLD AUTO: 7 THOUSAND/UL (ref 3.8–10.8)

## 2025-07-06 DIAGNOSIS — D58.2 ELEVATED HEMOGLOBIN: Primary | ICD-10-CM

## 2025-11-03 ENCOUNTER — APPOINTMENT (OUTPATIENT)
Dept: PRIMARY CARE | Facility: CLINIC | Age: 75
End: 2025-11-03
Payer: MEDICARE

## 2026-01-05 ENCOUNTER — APPOINTMENT (OUTPATIENT)
Dept: RHEUMATOLOGY | Facility: CLINIC | Age: 76
End: 2026-01-05
Payer: MEDICARE

## 2026-05-05 ENCOUNTER — APPOINTMENT (OUTPATIENT)
Dept: PRIMARY CARE | Facility: CLINIC | Age: 76
End: 2026-05-05
Payer: MEDICARE

## (undated) DEVICE — DRESSING, MEPILEX BORDER, POST-OP AG, 4 X 8 IN

## (undated) DEVICE — SUTURE, ETHILON, 3-0, 18 IN, PS2, BLACK

## (undated) DEVICE — GLOVE, SURGICAL, PROTEXIS PI ORTHO, 8.0, PF, LF

## (undated) DEVICE — SUTURE, VICRYL, 1, 27 IN, CT-1, VIOLET

## (undated) DEVICE — SUTURE, VICRYL, 2-0, 27 IN, FSL, UNDYED

## (undated) DEVICE — SLING, ARM, MEDIUM

## (undated) DEVICE — CLOSURE SYSTEM, DERMABOND, PRINEO, 22CM, STERILE

## (undated) DEVICE — STAPLER, SKIN PROXIMATE, 35 WIDE

## (undated) DEVICE — GLOVE, SURGICAL, PROTEXIS PI MICRO, 8.0, PF, LF

## (undated) DEVICE — RESERVOIR, WOUND, W/TROCAR, PVC, MEDIUM, 400CC, DAVOL, 1/8 IN, 10FR

## (undated) DEVICE — TIP, SUCTION, YANKAUER, FLEXIBLE

## (undated) DEVICE — COVER, BACK TABLE, 65 X 90, HVY REINFORCED

## (undated) DEVICE — DRAPE, INSTRUMENT, W/POUCH, STERI DRAPE, 7 X 11 IN, DISPOSABLE, STERILE

## (undated) DEVICE — DRILL BIT, 3.0MM GLENOID, V-SHAPE FLUTE, STERILE